# Patient Record
Sex: MALE | Race: WHITE | Employment: FULL TIME | URBAN - METROPOLITAN AREA
[De-identification: names, ages, dates, MRNs, and addresses within clinical notes are randomized per-mention and may not be internally consistent; named-entity substitution may affect disease eponyms.]

---

## 2017-03-01 ENCOUNTER — PATIENT MESSAGE (OUTPATIENT)
Dept: FAMILY MEDICINE CLINIC | Age: 52
End: 2017-03-01

## 2017-03-01 ENCOUNTER — OFFICE VISIT (OUTPATIENT)
Dept: FAMILY MEDICINE CLINIC | Age: 52
End: 2017-03-01

## 2017-03-01 VITALS
SYSTOLIC BLOOD PRESSURE: 139 MMHG | WEIGHT: 205.8 LBS | TEMPERATURE: 96.3 F | RESPIRATION RATE: 16 BRPM | OXYGEN SATURATION: 96 % | BODY MASS INDEX: 33.07 KG/M2 | HEART RATE: 98 BPM | DIASTOLIC BLOOD PRESSURE: 82 MMHG

## 2017-03-01 DIAGNOSIS — M54.12 CERVICAL RADICULOPATHY: Primary | ICD-10-CM

## 2017-03-01 DIAGNOSIS — M50.90 CERVICAL DISC DISEASE: ICD-10-CM

## 2017-03-01 DIAGNOSIS — E03.9 ACQUIRED HYPOTHYROIDISM: ICD-10-CM

## 2017-03-01 PROCEDURE — 99213 OFFICE O/P EST LOW 20 MIN: CPT | Performed by: FAMILY MEDICINE

## 2017-03-01 RX ORDER — SILDENAFIL 50 MG/1
TABLET, FILM COATED ORAL
Qty: 18 TABLET | Refills: 3 | Status: SHIPPED | OUTPATIENT
Start: 2017-03-01 | End: 2017-09-24 | Stop reason: SDUPTHER

## 2017-03-01 RX ORDER — METHYLPREDNISOLONE 4 MG/1
TABLET ORAL
Qty: 1 KIT | Refills: 0 | Status: SHIPPED | OUTPATIENT
Start: 2017-03-01 | End: 2017-03-07

## 2017-03-01 RX ORDER — CYCLOBENZAPRINE HCL 10 MG
TABLET ORAL
Qty: 90 TABLET | Refills: 2 | Status: SHIPPED | OUTPATIENT
Start: 2017-03-01 | End: 2017-07-07 | Stop reason: SDUPTHER

## 2017-03-01 RX ORDER — LEVOTHYROXINE SODIUM 0.03 MG/1
25 TABLET ORAL DAILY
Qty: 90 TABLET | Refills: 1 | Status: SHIPPED | OUTPATIENT
Start: 2017-03-01 | End: 2017-08-31 | Stop reason: SDUPTHER

## 2017-03-01 RX ORDER — HYDROCODONE BITARTRATE AND ACETAMINOPHEN 5; 325 MG/1; MG/1
TABLET ORAL
Qty: 60 TABLET | Refills: 0 | Status: SHIPPED | OUTPATIENT
Start: 2017-03-01 | End: 2017-03-02 | Stop reason: SDUPTHER

## 2017-03-02 ENCOUNTER — PATIENT MESSAGE (OUTPATIENT)
Dept: FAMILY MEDICINE CLINIC | Age: 52
End: 2017-03-02

## 2017-03-02 RX ORDER — HYDROCODONE BITARTRATE AND ACETAMINOPHEN 5; 325 MG/1; MG/1
TABLET ORAL
Qty: 60 TABLET | Refills: 0 | Status: SHIPPED | OUTPATIENT
Start: 2017-03-02 | End: 2017-03-16 | Stop reason: SDUPTHER

## 2017-03-16 ENCOUNTER — OFFICE VISIT (OUTPATIENT)
Dept: FAMILY MEDICINE CLINIC | Age: 52
End: 2017-03-16

## 2017-03-16 VITALS
WEIGHT: 212 LBS | HEART RATE: 80 BPM | BODY MASS INDEX: 34.07 KG/M2 | DIASTOLIC BLOOD PRESSURE: 82 MMHG | TEMPERATURE: 98 F | OXYGEN SATURATION: 97 % | RESPIRATION RATE: 16 BRPM | SYSTOLIC BLOOD PRESSURE: 136 MMHG

## 2017-03-16 DIAGNOSIS — M50.90 CERVICAL DISC DISEASE: ICD-10-CM

## 2017-03-16 DIAGNOSIS — S39.012A LUMBAR STRAIN, INITIAL ENCOUNTER: Primary | ICD-10-CM

## 2017-03-16 PROCEDURE — 99214 OFFICE O/P EST MOD 30 MIN: CPT | Performed by: FAMILY MEDICINE

## 2017-03-16 RX ORDER — METHOCARBAMOL 750 MG/1
750 TABLET, FILM COATED ORAL 4 TIMES DAILY
Qty: 40 TABLET | Refills: 0 | Status: SHIPPED | OUTPATIENT
Start: 2017-03-16 | End: 2017-03-26

## 2017-03-16 RX ORDER — HYDROCODONE BITARTRATE AND ACETAMINOPHEN 5; 325 MG/1; MG/1
TABLET ORAL
Qty: 60 TABLET | Refills: 0 | Status: SHIPPED | OUTPATIENT
Start: 2017-03-16 | End: 2017-05-11 | Stop reason: SDUPTHER

## 2017-03-17 ENCOUNTER — PATIENT MESSAGE (OUTPATIENT)
Dept: FAMILY MEDICINE CLINIC | Age: 52
End: 2017-03-17

## 2017-03-24 ENCOUNTER — HOSPITAL ENCOUNTER (OUTPATIENT)
Dept: PHYSICAL THERAPY | Age: 52
Discharge: HOME | End: 2017-03-31
Admitting: FAMILY MEDICINE

## 2017-03-24 ENCOUNTER — PATIENT MESSAGE (OUTPATIENT)
Dept: FAMILY MEDICINE CLINIC | Age: 52
End: 2017-03-24

## 2017-05-11 ENCOUNTER — OFFICE VISIT (OUTPATIENT)
Dept: FAMILY MEDICINE CLINIC | Age: 52
End: 2017-05-11

## 2017-05-11 ENCOUNTER — HOSPITAL ENCOUNTER (OUTPATIENT)
Dept: OTHER | Age: 52
Discharge: HOME | End: 2017-05-11
Attending: FAMILY MEDICINE | Admitting: FAMILY MEDICINE

## 2017-05-11 ENCOUNTER — TELEPHONE (OUTPATIENT)
Dept: FAMILY MEDICINE CLINIC | Age: 52
End: 2017-05-11

## 2017-05-11 VITALS
HEART RATE: 83 BPM | RESPIRATION RATE: 16 BRPM | BODY MASS INDEX: 32.85 KG/M2 | TEMPERATURE: 96.5 F | WEIGHT: 204.4 LBS | DIASTOLIC BLOOD PRESSURE: 76 MMHG | SYSTOLIC BLOOD PRESSURE: 115 MMHG

## 2017-05-11 DIAGNOSIS — S39.012A LUMBAR STRAIN, INITIAL ENCOUNTER: ICD-10-CM

## 2017-05-11 DIAGNOSIS — M54.16 LUMBAR RADICULOPATHY: ICD-10-CM

## 2017-05-11 DIAGNOSIS — M54.16 LUMBAR RADICULOPATHY: Primary | ICD-10-CM

## 2017-05-11 PROCEDURE — 99214 OFFICE O/P EST MOD 30 MIN: CPT | Performed by: FAMILY MEDICINE

## 2017-05-11 RX ORDER — HYDROCODONE BITARTRATE AND ACETAMINOPHEN 5; 325 MG/1; MG/1
1-2 TABLET ORAL EVERY 6 HOURS PRN
Qty: 60 TABLET | Refills: 0 | Status: SHIPPED | OUTPATIENT
Start: 2017-05-11 | End: 2017-07-07 | Stop reason: ALTCHOICE

## 2017-05-11 RX ORDER — ALPRAZOLAM 0.5 MG/1
TABLET ORAL
Qty: 2 TABLET | Refills: 0 | Status: SHIPPED | OUTPATIENT
Start: 2017-05-11 | End: 2017-07-07 | Stop reason: ALTCHOICE

## 2017-05-11 RX ORDER — HYDROCODONE BITARTRATE AND ACETAMINOPHEN 5; 325 MG/1; MG/1
1-2 TABLET ORAL EVERY 6 HOURS PRN
Qty: 60 TABLET | Refills: 0 | Status: SHIPPED | OUTPATIENT
Start: 2017-05-11 | End: 2017-05-11 | Stop reason: SDUPTHER

## 2017-05-31 ENCOUNTER — TELEPHONE (OUTPATIENT)
Dept: FAMILY MEDICINE CLINIC | Age: 52
End: 2017-05-31

## 2017-06-01 ENCOUNTER — TELEPHONE (OUTPATIENT)
Dept: FAMILY MEDICINE CLINIC | Age: 52
End: 2017-06-01

## 2017-06-07 ENCOUNTER — TELEPHONE (OUTPATIENT)
Dept: FAMILY MEDICINE CLINIC | Age: 52
End: 2017-06-07

## 2017-06-15 ENCOUNTER — PATIENT MESSAGE (OUTPATIENT)
Dept: FAMILY MEDICINE CLINIC | Age: 52
End: 2017-06-15

## 2017-06-15 DIAGNOSIS — M47.816 FACET ARTHROPATHY, LUMBAR: Primary | ICD-10-CM

## 2017-06-16 ENCOUNTER — TELEPHONE (OUTPATIENT)
Dept: PAIN MANAGEMENT | Age: 52
End: 2017-06-16

## 2017-06-21 ENCOUNTER — OFFICE VISIT (OUTPATIENT)
Dept: FAMILY MEDICINE CLINIC | Age: 52
End: 2017-06-21

## 2017-06-21 VITALS
DIASTOLIC BLOOD PRESSURE: 79 MMHG | SYSTOLIC BLOOD PRESSURE: 125 MMHG | HEART RATE: 79 BPM | BODY MASS INDEX: 31.76 KG/M2 | TEMPERATURE: 97.5 F | WEIGHT: 197.6 LBS | OXYGEN SATURATION: 99 % | RESPIRATION RATE: 16 BRPM

## 2017-06-21 DIAGNOSIS — M47.819 FACET ARTHROPATHY OF SPINE: Primary | ICD-10-CM

## 2017-06-21 PROCEDURE — 99214 OFFICE O/P EST MOD 30 MIN: CPT | Performed by: FAMILY MEDICINE

## 2017-06-21 RX ORDER — HYDROCODONE BITARTRATE AND ACETAMINOPHEN 10; 325 MG/1; MG/1
1 TABLET ORAL EVERY 6 HOURS PRN
Qty: 60 TABLET | Refills: 0 | Status: SHIPPED | OUTPATIENT
Start: 2017-06-21 | End: 2017-07-07 | Stop reason: DRUGHIGH

## 2017-06-21 RX ORDER — METHOCARBAMOL 750 MG/1
750 TABLET, FILM COATED ORAL 2 TIMES DAILY
Qty: 40 TABLET | Refills: 1 | Status: SHIPPED | OUTPATIENT
Start: 2017-06-21 | End: 2017-07-07

## 2017-06-21 RX ORDER — NABUMETONE 750 MG/1
750 TABLET, FILM COATED ORAL 2 TIMES DAILY
Qty: 60 TABLET | Refills: 3 | Status: SHIPPED | OUTPATIENT
Start: 2017-06-21 | End: 2017-07-07 | Stop reason: ALTCHOICE

## 2017-06-21 ASSESSMENT — PATIENT HEALTH QUESTIONNAIRE - PHQ9
1. LITTLE INTEREST OR PLEASURE IN DOING THINGS: 2
SUM OF ALL RESPONSES TO PHQ9 QUESTIONS 1 & 2: 3
SUM OF ALL RESPONSES TO PHQ QUESTIONS 1-9: 3
2. FEELING DOWN, DEPRESSED OR HOPELESS: 1

## 2017-07-07 ENCOUNTER — OFFICE VISIT (OUTPATIENT)
Dept: PAIN MANAGEMENT | Age: 52
End: 2017-07-07

## 2017-07-07 VITALS
HEART RATE: 81 BPM | BODY MASS INDEX: 30.52 KG/M2 | DIASTOLIC BLOOD PRESSURE: 85 MMHG | WEIGHT: 201.4 LBS | SYSTOLIC BLOOD PRESSURE: 124 MMHG | HEIGHT: 68 IN

## 2017-07-07 DIAGNOSIS — M47.816 SPONDYLOSIS OF LUMBAR REGION WITHOUT MYELOPATHY OR RADICULOPATHY: Primary | ICD-10-CM

## 2017-07-07 DIAGNOSIS — Z79.891 CHRONIC PRESCRIPTION OPIATE USE: ICD-10-CM

## 2017-07-07 DIAGNOSIS — M50.90 CERVICAL DISC DISEASE: ICD-10-CM

## 2017-07-07 PROCEDURE — 99204 OFFICE O/P NEW MOD 45 MIN: CPT | Performed by: ANESTHESIOLOGY

## 2017-07-07 RX ORDER — HYDROCODONE BITARTRATE AND ACETAMINOPHEN 5; 325 MG/1; MG/1
1 TABLET ORAL 2 TIMES DAILY PRN
Qty: 60 TABLET | Refills: 0 | Status: SHIPPED | OUTPATIENT
Start: 2017-07-07 | End: 2017-07-25 | Stop reason: SDUPTHER

## 2017-07-07 RX ORDER — CYCLOBENZAPRINE HCL 10 MG
TABLET ORAL
Qty: 60 TABLET | Refills: 1 | Status: SHIPPED | OUTPATIENT
Start: 2017-07-07 | End: 2017-09-01

## 2017-07-07 RX ORDER — NAPROXEN 250 MG/1
250 TABLET ORAL 2 TIMES DAILY WITH MEALS
COMMUNITY
End: 2017-07-07 | Stop reason: SDUPTHER

## 2017-07-07 RX ORDER — NAPROXEN 250 MG/1
250 TABLET ORAL 2 TIMES DAILY WITH MEALS
Qty: 60 TABLET | Refills: 1 | Status: SHIPPED | OUTPATIENT
Start: 2017-07-07 | End: 2022-04-05

## 2017-07-07 ASSESSMENT — PATIENT HEALTH QUESTIONNAIRE - PHQ9
1. LITTLE INTEREST OR PLEASURE IN DOING THINGS: 1
SUM OF ALL RESPONSES TO PHQ QUESTIONS 1-9: 2
SUM OF ALL RESPONSES TO PHQ9 QUESTIONS 1 & 2: 2
2. FEELING DOWN, DEPRESSED OR HOPELESS: 1

## 2017-07-07 ASSESSMENT — ENCOUNTER SYMPTOMS
SHORTNESS OF BREATH: 0
EYE REDNESS: 0
CONSTIPATION: 0
EYE PAIN: 0
ORTHOPNEA: 0
HEARTBURN: 0
WHEEZING: 0
ABDOMINAL PAIN: 0
NAUSEA: 0
EYE DISCHARGE: 0
COUGH: 0
BACK PAIN: 1
VOMITING: 0
HEMOPTYSIS: 0

## 2017-07-10 LAB
6-ACETYLMORPHINE: NOT DETECTED
7-AMINOCLONAZEPAM: NOT DETECTED
ALPHA-OH-ALPRAZOLAM: NOT DETECTED
ALPRAZOLAM: NOT DETECTED
AMPHETAMINE: NOT DETECTED
BARBITURATES: NOT DETECTED
BENZOYLECGONINE: NOT DETECTED
BUPRENORPHINE: NOT DETECTED
CARISOPRODOL: NOT DETECTED
CLONAZEPAM: NOT DETECTED
CODEINE: NOT DETECTED
CREATININE URINE: 35.4 MG/DL (ref 20–400)
DIAZEPAM: NOT DETECTED
DRUGS EXPECTED: NORMAL
EER PAIN MGT DRUG PANEL, HIGH RES/EMIT U: NORMAL
ETHYL GLUCURONIDE: NOT DETECTED
FENTANYL: NOT DETECTED
HYDROCODONE: PRESENT
HYDROMORPHONE: NOT DETECTED
LORAZEPAM: NOT DETECTED
MARIJUANA METABOLITE: NOT DETECTED
MDA: NOT DETECTED
MDEA: NOT DETECTED
MDMA URINE: NOT DETECTED
MEPERIDINE: NOT DETECTED
METHADONE: NOT DETECTED
METHAMPHETAMINE: NOT DETECTED
METHYLPHENIDATE: NOT DETECTED
MIDAZOLAM: NOT DETECTED
MORPHINE: NOT DETECTED
NORBUPRENORPHINE, FREE: NOT DETECTED
NORDIAZEPAM: NOT DETECTED
NORFENTANYL: NOT DETECTED
NORHYDROCODONE, URINE: PRESENT
NOROXYCODONE: NOT DETECTED
NOROXYMORPHONE, URINE: NOT DETECTED
OXAZEPAM: NOT DETECTED
OXYCODONE: NOT DETECTED
OXYMORPHONE: NOT DETECTED
PAIN MANAGEMENT DRUG PANEL: NORMAL
PAIN MANAGEMENT DRUG PANEL: NORMAL
PCP: NOT DETECTED
PHENTERMINE: NOT DETECTED
PROPOXYPHENE: NOT DETECTED
TAPENTADOL, URINE: NOT DETECTED
TAPENTADOL-O-SULFATE, URINE: NOT DETECTED
TEMAZEPAM: NOT DETECTED
TRAMADOL: NOT DETECTED
ZOLPIDEM: NOT DETECTED

## 2017-07-12 ENCOUNTER — PATIENT MESSAGE (OUTPATIENT)
Dept: PAIN MANAGEMENT | Age: 52
End: 2017-07-12

## 2017-07-25 ENCOUNTER — TELEPHONE (OUTPATIENT)
Dept: PAIN MANAGEMENT | Age: 52
End: 2017-07-25

## 2017-07-25 DIAGNOSIS — M47.816 SPONDYLOSIS OF LUMBAR REGION WITHOUT MYELOPATHY OR RADICULOPATHY: ICD-10-CM

## 2017-07-25 DIAGNOSIS — M47.816 FACET ARTHROPATHY, LUMBAR: Primary | ICD-10-CM

## 2017-07-25 RX ORDER — HYDROCODONE BITARTRATE AND ACETAMINOPHEN 5; 325 MG/1; MG/1
1 TABLET ORAL EVERY 8 HOURS PRN
Qty: 60 TABLET | Refills: 0 | Status: SHIPPED | OUTPATIENT
Start: 2017-07-28 | End: 2017-08-16 | Stop reason: SDUPTHER

## 2017-08-14 DIAGNOSIS — M47.819 FACET ARTHROPATHY OF SPINE: Primary | ICD-10-CM

## 2017-08-15 ENCOUNTER — TELEPHONE (OUTPATIENT)
Dept: PAIN MANAGEMENT | Age: 52
End: 2017-08-15

## 2017-08-15 DIAGNOSIS — M47.816 FACET ARTHROPATHY, LUMBAR: ICD-10-CM

## 2017-08-15 DIAGNOSIS — M47.816 SPONDYLOSIS OF LUMBAR REGION WITHOUT MYELOPATHY OR RADICULOPATHY: ICD-10-CM

## 2017-08-16 RX ORDER — HYDROCODONE BITARTRATE AND ACETAMINOPHEN 5; 325 MG/1; MG/1
1 TABLET ORAL EVERY 8 HOURS PRN
Qty: 60 TABLET | Refills: 0 | Status: SHIPPED | OUTPATIENT
Start: 2017-08-16 | End: 2017-09-05 | Stop reason: SDUPTHER

## 2017-08-31 DIAGNOSIS — M50.90 CERVICAL DISC DISEASE: ICD-10-CM

## 2017-08-31 DIAGNOSIS — E03.9 ACQUIRED HYPOTHYROIDISM: ICD-10-CM

## 2017-09-01 RX ORDER — LEVOTHYROXINE SODIUM 0.03 MG/1
TABLET ORAL
Qty: 90 TABLET | Refills: 1 | Status: SHIPPED | OUTPATIENT
Start: 2017-09-01 | End: 2018-02-05 | Stop reason: SDUPTHER

## 2017-09-01 RX ORDER — CYCLOBENZAPRINE HCL 10 MG
TABLET ORAL
Qty: 90 TABLET | Refills: 2 | Status: SHIPPED | OUTPATIENT
Start: 2017-09-01 | End: 2018-04-21 | Stop reason: SDUPTHER

## 2017-09-05 ENCOUNTER — TELEPHONE (OUTPATIENT)
Dept: PAIN MANAGEMENT | Age: 52
End: 2017-09-05

## 2017-09-05 DIAGNOSIS — M47.816 SPONDYLOSIS OF LUMBAR REGION WITHOUT MYELOPATHY OR RADICULOPATHY: ICD-10-CM

## 2017-09-05 DIAGNOSIS — M47.816 FACET ARTHROPATHY, LUMBAR: ICD-10-CM

## 2017-09-05 RX ORDER — HYDROCODONE BITARTRATE AND ACETAMINOPHEN 5; 325 MG/1; MG/1
1 TABLET ORAL EVERY 8 HOURS PRN
Qty: 90 TABLET | Refills: 0 | Status: SHIPPED | OUTPATIENT
Start: 2017-09-05 | End: 2017-10-04 | Stop reason: SDUPTHER

## 2017-09-25 RX ORDER — SILDENAFIL CITRATE 50 MG
TABLET ORAL
Qty: 18 TABLET | Refills: 3 | Status: SHIPPED | OUTPATIENT
Start: 2017-09-25 | End: 2018-02-16 | Stop reason: SDUPTHER

## 2017-09-28 ENCOUNTER — HOSPITAL ENCOUNTER (OUTPATIENT)
Dept: CARDIAC CATH/INVASIVE PROCEDURES | Age: 52
Discharge: HOME | End: 2017-09-28
Attending: ANESTHESIOLOGY | Admitting: ANESTHESIOLOGY

## 2017-09-28 DIAGNOSIS — G89.29 OTHER CHRONIC PAIN: Primary | ICD-10-CM

## 2017-09-28 DIAGNOSIS — M47.816 SPONDYLOSIS OF LUMBAR REGION WITHOUT MYELOPATHY OR RADICULOPATHY: ICD-10-CM

## 2017-09-28 RX ORDER — DULOXETIN HYDROCHLORIDE 20 MG/1
20 CAPSULE, DELAYED RELEASE ORAL DAILY
Qty: 30 CAPSULE | Refills: 0 | Status: SHIPPED | OUTPATIENT
Start: 2017-09-28 | End: 2017-12-04 | Stop reason: SDUPTHER

## 2017-09-29 ENCOUNTER — TELEPHONE (OUTPATIENT)
Dept: PAIN MANAGEMENT | Age: 52
End: 2017-09-29

## 2017-10-04 DIAGNOSIS — M47.816 FACET ARTHROPATHY, LUMBAR: ICD-10-CM

## 2017-10-04 DIAGNOSIS — M47.816 SPONDYLOSIS OF LUMBAR REGION WITHOUT MYELOPATHY OR RADICULOPATHY: ICD-10-CM

## 2017-10-04 RX ORDER — HYDROCODONE BITARTRATE AND ACETAMINOPHEN 5; 325 MG/1; MG/1
1 TABLET ORAL 2 TIMES DAILY PRN
Qty: 60 TABLET | Refills: 0 | Status: SHIPPED | OUTPATIENT
Start: 2017-10-04 | End: 2017-12-04 | Stop reason: DRUGHIGH

## 2017-10-12 ENCOUNTER — HOSPITAL ENCOUNTER (OUTPATIENT)
Dept: CARDIAC CATH/INVASIVE PROCEDURES | Age: 52
Discharge: HOME | End: 2017-10-12
Attending: ANESTHESIOLOGY | Admitting: ANESTHESIOLOGY

## 2017-10-12 DIAGNOSIS — M47.817 FACET DEGENERATION OF LUMBOSACRAL REGION: ICD-10-CM

## 2017-10-12 ASSESSMENT — ENCOUNTER SYMPTOMS
EYE REDNESS: 0
HEARTBURN: 0
EYE DISCHARGE: 0
HEMOPTYSIS: 0
EYE PAIN: 0
SHORTNESS OF BREATH: 0
ABDOMINAL PAIN: 0
COUGH: 0
ORTHOPNEA: 0
BACK PAIN: 1
WHEEZING: 0
CONSTIPATION: 0
NAUSEA: 0
VOMITING: 0

## 2017-10-12 NOTE — PROCEDURES
PROCEDURE: Lumbar facet medial branch blocks at L4-L5 and L5-S1 levels BILATERAL under fluoroscopy    Anesthesia: Local  Blood loss: None  Complications: None     Patient has chronic axial low back pain, unresponsive to physical therapy, medications, worse on extension/lateral rotation - here for diagnostic lumbar facet injections.     The entire procedure was done under sterile precautions. After informed consent, patient placed prone and monitors placed. The back was cleaned with chloraprep, skin and subcutaneous tissues infiltrated with 1% lidocaine. I used 22g 3 1/2 in long needles to access the junction of the superior aspect of the ala of sacrum with the inferior articular process of L5 and also the junctions of the superior aspect of transverse process with superior articular process of L4 and L5 bilaterally under fluoroscopy. After verification of needle tip under fluoroscopy, and negative aspiration for blood, I placed 0.5 ml of 0.25% Bupivacaine at each location for a total of 6 injections. Then the needles were withdrawn and hemostasis obtained and entry sites covered with band-aids.      Patient tolerated procedure well without any problems, was ambulatory and discharged in stable condition.     Plan:  Reports good benefit from the first diagnostic injection; will consider RF, if he continues to benefit.

## 2017-10-19 ENCOUNTER — TELEPHONE (OUTPATIENT)
Dept: PAIN MANAGEMENT | Age: 52
End: 2017-10-19

## 2017-10-19 NOTE — TELEPHONE ENCOUNTER
Noted.  Needs precert for RF lumb facets L4-5, L5-S1 Bilat  We have to find out where the RF unit is available in [place] - I will leave message for that.

## 2017-11-02 DIAGNOSIS — M47.816 SPONDYLOSIS OF LUMBAR REGION WITHOUT MYELOPATHY OR RADICULOPATHY: ICD-10-CM

## 2017-11-02 DIAGNOSIS — M47.816 FACET ARTHROPATHY, LUMBAR: ICD-10-CM

## 2017-11-02 RX ORDER — HYDROCODONE BITARTRATE AND ACETAMINOPHEN 5; 325 MG/1; MG/1
1 TABLET ORAL EVERY 8 HOURS PRN
Qty: 90 TABLET | Refills: 0 | Status: SHIPPED | OUTPATIENT
Start: 2017-11-02 | End: 2017-12-04 | Stop reason: DRUGHIGH

## 2017-11-02 NOTE — TELEPHONE ENCOUNTER
The patient called in and is needing a refill of his Hydrocodone, and Duloxetine.           [facility]

## 2017-12-04 ENCOUNTER — OFFICE VISIT (OUTPATIENT)
Dept: PAIN MANAGEMENT | Age: 52
End: 2017-12-04

## 2017-12-04 VITALS
DIASTOLIC BLOOD PRESSURE: 85 MMHG | SYSTOLIC BLOOD PRESSURE: 127 MMHG | HEIGHT: 68 IN | HEART RATE: 81 BPM | BODY MASS INDEX: 31.67 KG/M2 | WEIGHT: 209 LBS

## 2017-12-04 DIAGNOSIS — M47.896 OTHER SPONDYLOSIS, LUMBAR REGION: Primary | ICD-10-CM

## 2017-12-04 DIAGNOSIS — G89.29 OTHER CHRONIC PAIN: ICD-10-CM

## 2017-12-04 PROCEDURE — 99213 OFFICE O/P EST LOW 20 MIN: CPT | Performed by: ANESTHESIOLOGY

## 2017-12-04 RX ORDER — HYDROCODONE BITARTRATE AND ACETAMINOPHEN 5; 325 MG/1; MG/1
1 TABLET ORAL 2 TIMES DAILY PRN
Qty: 60 TABLET | Refills: 0 | Status: SHIPPED | OUTPATIENT
Start: 2017-12-04 | End: 2018-02-15 | Stop reason: SDUPTHER

## 2017-12-04 RX ORDER — DULOXETIN HYDROCHLORIDE 20 MG/1
20 CAPSULE, DELAYED RELEASE ORAL DAILY
Qty: 30 CAPSULE | Refills: 0 | Status: SHIPPED | OUTPATIENT
Start: 2017-12-04 | End: 2018-05-11

## 2017-12-04 RX ORDER — HYDROCODONE BITARTRATE AND ACETAMINOPHEN 5; 325 MG/1; MG/1
1 TABLET ORAL EVERY 8 HOURS PRN
COMMUNITY
End: 2017-12-04 | Stop reason: DRUGHIGH

## 2017-12-04 ASSESSMENT — ENCOUNTER SYMPTOMS
VOMITING: 0
ABDOMINAL PAIN: 0
EYE DISCHARGE: 0
SHORTNESS OF BREATH: 0
BACK PAIN: 1
NAUSEA: 0
EYE PAIN: 0
CONSTIPATION: 0
HEMOPTYSIS: 0
EYE REDNESS: 0
ORTHOPNEA: 0
COUGH: 0
WHEEZING: 0
HEARTBURN: 0

## 2017-12-04 NOTE — PATIENT INSTRUCTIONS
you home. How is medial branch neurotomy done? The doctor will use a tiny needle to numb the skin where you will get the neurotomy. Then he or she puts the neurotomy needle into the numbed area. You may feel some pressure. Using fluoroscopy (live X-ray) to guide the needle, the doctor sends radio waves through the needle to the nerve for 60 to 90 seconds. The radio waves heat the nerve, which damages it. The doctor may do this several times. And he or she may treat more than one nerve. It takes 45 to 90 minutes to get a neurotomy, depending on how many nerves are heated. You will probably go home 30 to 60 minutes later. You will need someone to drive you home. What can you expect after a neurotomy? You may feel a little sore or tender at the injection site at first. But after a successful neurotomy, most people have pain relief right away. It often lasts for 9 to 12 months or longer. Sometimes the pain relief is permanent. If your pain does come back, it may mean that the damaged nerve has healed and can send pain messages again. Or it can mean that a different nerve is causing pain. Your doctor will discuss your options with you. Follow-up care is a key part of your treatment and safety. Be sure to make and go to all appointments, and call your doctor if you are having problems. It's also a good idea to know your test results and keep a list of the medicines you take. Where can you learn more? Go to https://64 Pixels.[employer]. org and sign in to your VISENZE account. Enter F905 in the [employer] [facility] box to learn more about \"Learning About Medial Branch Block and Neurotomy. \"     If you do not have an account, please click on the \"Sign Up Now\" link. Current as of: October 14, 2016  Content Version: 11.3  © 7789-7562 [employer], [employer]orporated. Care instructions adapted under license by [facility] ([facility]).  If you have questions about a medical condition or this instruction, always ask your

## 2018-01-31 ENCOUNTER — TELEPHONE (OUTPATIENT)
Dept: PAIN MANAGEMENT | Age: 53
End: 2018-01-31

## 2018-01-31 DIAGNOSIS — M50.90 CERVICAL DISC DISEASE: ICD-10-CM

## 2018-01-31 DIAGNOSIS — M47.816 FACET ARTHROPATHY, LUMBAR: Primary | ICD-10-CM

## 2018-01-31 DIAGNOSIS — M47.819 FACET ARTHROPATHY OF SPINE: ICD-10-CM

## 2018-01-31 NOTE — TELEPHONE ENCOUNTER
Per PKA pt can have a rx of Norco 5-325mg PO TID, enough until dr. Shaylee Jenkins returns. Rx is pending what office pt wants to  at. PT needs to be informed of office locations and times we are there. Called pt and informed. Pt is going to be by the hospitals office tmr, if not tmr then Friday. Rx will be printed when pt comes to office.

## 2018-02-01 RX ORDER — HYDROCODONE BITARTRATE AND ACETAMINOPHEN 5; 325 MG/1; MG/1
1 TABLET ORAL EVERY 8 HOURS PRN
Qty: 18 TABLET | Refills: 0 | Status: SHIPPED | OUTPATIENT
Start: 2018-02-01 | End: 2018-02-15 | Stop reason: ALTCHOICE

## 2018-02-05 DIAGNOSIS — E03.9 ACQUIRED HYPOTHYROIDISM: ICD-10-CM

## 2018-02-05 RX ORDER — LEVOTHYROXINE SODIUM 0.03 MG/1
TABLET ORAL
Qty: 90 TABLET | Refills: 1 | Status: SHIPPED | OUTPATIENT
Start: 2018-02-05 | End: 2018-05-13 | Stop reason: SDUPTHER

## 2018-02-15 DIAGNOSIS — M47.896 OTHER SPONDYLOSIS, LUMBAR REGION: ICD-10-CM

## 2018-02-15 RX ORDER — HYDROCODONE BITARTRATE AND ACETAMINOPHEN 5; 325 MG/1; MG/1
1 TABLET ORAL 2 TIMES DAILY PRN
Qty: 60 TABLET | Refills: 0 | Status: SHIPPED | OUTPATIENT
Start: 2018-02-15 | End: 2018-04-13 | Stop reason: SDUPTHER

## 2018-02-15 RX ORDER — HYDROCODONE BITARTRATE AND ACETAMINOPHEN 5; 325 MG/1; MG/1
1 TABLET ORAL EVERY 8 HOURS PRN
Qty: 18 TABLET | Refills: 0 | Status: CANCELLED | OUTPATIENT
Start: 2018-02-15 | End: 2018-02-21

## 2018-02-15 NOTE — TELEPHONE ENCOUNTER
MEDICATION ISSUES    ALL MEDICATION REFILLS NEED 48-HOUR ADVANCED NOTICE - Not filled on weekends or holidays. Issue:  Medication Refill   Requesting Refill: Yes    Medication requested: Norco 5 mg    Reporting Side effects: No / If other, Describe: None    Last date filled: 2/1/18 By Dr. Caprice Buerger due to Dr. Tosha Renae being out of office for that week. OARRS done: Yes   Follow-up Appointment date: No   Is waiting for a [employer] appt.

## 2018-02-18 RX ORDER — SILDENAFIL 50 MG/1
TABLET, FILM COATED ORAL
Qty: 18 TABLET | Refills: 3 | Status: SHIPPED | OUTPATIENT
Start: 2018-02-18 | End: 2018-07-26 | Stop reason: SDUPTHER

## 2018-04-13 DIAGNOSIS — M47.896 OTHER SPONDYLOSIS, LUMBAR REGION: ICD-10-CM

## 2018-04-13 RX ORDER — HYDROCODONE BITARTRATE AND ACETAMINOPHEN 5; 325 MG/1; MG/1
1 TABLET ORAL 2 TIMES DAILY PRN
Qty: 60 TABLET | Refills: 0 | Status: SHIPPED | OUTPATIENT
Start: 2018-04-16 | End: 2018-04-30 | Stop reason: ALTCHOICE

## 2018-04-27 ENCOUNTER — TELEPHONE (OUTPATIENT)
Dept: PAIN MANAGEMENT | Age: 53
End: 2018-04-27

## 2018-04-30 ENCOUNTER — OFFICE VISIT (OUTPATIENT)
Dept: PAIN MANAGEMENT | Age: 53
End: 2018-04-30

## 2018-04-30 VITALS
HEIGHT: 68 IN | BODY MASS INDEX: 31.52 KG/M2 | HEART RATE: 102 BPM | WEIGHT: 208 LBS | DIASTOLIC BLOOD PRESSURE: 79 MMHG | SYSTOLIC BLOOD PRESSURE: 115 MMHG

## 2018-04-30 DIAGNOSIS — M47.896 OTHER SPONDYLOSIS, LUMBAR REGION: Primary | ICD-10-CM

## 2018-04-30 DIAGNOSIS — G89.29 OTHER CHRONIC PAIN: ICD-10-CM

## 2018-04-30 PROCEDURE — 99212 OFFICE O/P EST SF 10 MIN: CPT | Performed by: ANESTHESIOLOGY

## 2018-04-30 ASSESSMENT — ENCOUNTER SYMPTOMS
EYE DISCHARGE: 0
HEARTBURN: 0
HEMOPTYSIS: 0
ORTHOPNEA: 0
NAUSEA: 0
CONSTIPATION: 0
BACK PAIN: 0
ABDOMINAL PAIN: 0
COUGH: 0
SHORTNESS OF BREATH: 0
WHEEZING: 0
VOMITING: 0
EYE REDNESS: 0
EYE PAIN: 0

## 2018-05-08 PROBLEM — Z12.11 COLON CANCER SCREENING: Status: ACTIVE | Noted: 2018-05-08

## 2018-05-11 ENCOUNTER — OFFICE VISIT (OUTPATIENT)
Dept: FAMILY MEDICINE CLINIC | Age: 53
End: 2018-05-11

## 2018-05-11 VITALS
HEART RATE: 88 BPM | BODY MASS INDEX: 31.64 KG/M2 | TEMPERATURE: 95.7 F | RESPIRATION RATE: 16 BRPM | WEIGHT: 208.8 LBS | HEIGHT: 68 IN | OXYGEN SATURATION: 98 % | SYSTOLIC BLOOD PRESSURE: 127 MMHG | DIASTOLIC BLOOD PRESSURE: 89 MMHG

## 2018-05-11 DIAGNOSIS — E03.9 ACQUIRED HYPOTHYROIDISM: ICD-10-CM

## 2018-05-11 DIAGNOSIS — R74.8 ELEVATED LIVER ENZYMES: ICD-10-CM

## 2018-05-11 DIAGNOSIS — Z00.00 WELL ADULT EXAM: Primary | ICD-10-CM

## 2018-05-11 DIAGNOSIS — E78.1 HYPERTRIGLYCERIDEMIA: ICD-10-CM

## 2018-05-11 DIAGNOSIS — Z00.00 WELL ADULT EXAM: ICD-10-CM

## 2018-05-11 DIAGNOSIS — Z12.11 COLON CANCER SCREENING: ICD-10-CM

## 2018-05-11 LAB
A/G RATIO: 2.7 (ref 1.1–2.2)
ALBUMIN SERPL-MCNC: 5.4 G/DL (ref 3.4–5)
ALP BLD-CCNC: 46 U/L (ref 40–129)
ALT SERPL-CCNC: 39 U/L (ref 10–40)
ANION GAP SERPL CALCULATED.3IONS-SCNC: 16 MMOL/L (ref 3–16)
AST SERPL-CCNC: 25 U/L (ref 15–37)
BANDED NEUTROPHILS RELATIVE PERCENT: 1 % (ref 0–7)
BASOPHILS ABSOLUTE: 0 K/UL (ref 0–0.2)
BASOPHILS RELATIVE PERCENT: 0 %
BILIRUB SERPL-MCNC: 0.6 MG/DL (ref 0–1)
BUN BLDV-MCNC: 12 MG/DL (ref 7–20)
CALCIUM SERPL-MCNC: 9.7 MG/DL (ref 8.3–10.6)
CHLORIDE BLD-SCNC: 96 MMOL/L (ref 99–110)
CHOLESTEROL, TOTAL: 182 MG/DL (ref 0–199)
CO2: 23 MMOL/L (ref 21–32)
CREAT SERPL-MCNC: 0.7 MG/DL (ref 0.9–1.3)
EOSINOPHILS ABSOLUTE: 0.5 K/UL (ref 0–0.6)
EOSINOPHILS RELATIVE PERCENT: 6 %
GFR AFRICAN AMERICAN: >60
GFR NON-AFRICAN AMERICAN: >60
GLOBULIN: 2 G/DL
GLUCOSE BLD-MCNC: 95 MG/DL (ref 70–99)
HCT VFR BLD CALC: 46.2 % (ref 40.5–52.5)
HDLC SERPL-MCNC: 42 MG/DL (ref 40–60)
HEMOGLOBIN: 15.8 G/DL (ref 13.5–17.5)
LDL CHOLESTEROL CALCULATED: 102 MG/DL
LYMPHOCYTES ABSOLUTE: 3.3 K/UL (ref 1–5.1)
LYMPHOCYTES RELATIVE PERCENT: 38 %
MCH RBC QN AUTO: 29.8 PG (ref 26–34)
MCHC RBC AUTO-ENTMCNC: 34.1 G/DL (ref 31–36)
MCV RBC AUTO: 87.4 FL (ref 80–100)
MONOCYTES ABSOLUTE: 0.4 K/UL (ref 0–1.3)
MONOCYTES RELATIVE PERCENT: 5 %
NEUTROPHILS ABSOLUTE: 4.4 K/UL (ref 1.7–7.7)
NEUTROPHILS RELATIVE PERCENT: 50 %
PDW BLD-RTO: 13.2 % (ref 12.4–15.4)
PLATELET # BLD: 232 K/UL (ref 135–450)
PMV BLD AUTO: 8.6 FL (ref 5–10.5)
POIKILOCYTES: ABNORMAL
POTASSIUM SERPL-SCNC: 4.2 MMOL/L (ref 3.5–5.1)
RBC # BLD: 5.29 M/UL (ref 4.2–5.9)
SODIUM BLD-SCNC: 135 MMOL/L (ref 136–145)
TOTAL PROTEIN: 7.4 G/DL (ref 6.4–8.2)
TRIGL SERPL-MCNC: 191 MG/DL (ref 0–150)
TSH REFLEX: 8.16 UIU/ML (ref 0.27–4.2)
VLDLC SERPL CALC-MCNC: 38 MG/DL
WBC # BLD: 8.6 K/UL (ref 4–11)

## 2018-05-11 PROCEDURE — 99396 PREV VISIT EST AGE 40-64: CPT | Performed by: FAMILY MEDICINE

## 2018-05-11 ASSESSMENT — ENCOUNTER SYMPTOMS: BACK PAIN: 1

## 2018-05-12 LAB
ESTIMATED AVERAGE GLUCOSE: 111.2 MG/DL
HBA1C MFR BLD: 5.5 %
T4 FREE: 1.3 NG/DL (ref 0.9–1.8)

## 2018-05-13 DIAGNOSIS — E03.9 ACQUIRED HYPOTHYROIDISM: ICD-10-CM

## 2018-05-13 DIAGNOSIS — E03.9 ACQUIRED HYPOTHYROIDISM: Primary | ICD-10-CM

## 2018-05-13 RX ORDER — LEVOTHYROXINE SODIUM 0.05 MG/1
50 TABLET ORAL DAILY
Qty: 30 TABLET | Refills: 3 | Status: SHIPPED | OUTPATIENT
Start: 2018-05-13 | End: 2018-08-05

## 2018-05-14 DIAGNOSIS — M47.896 OTHER SPONDYLOSIS, LUMBAR REGION: ICD-10-CM

## 2018-05-14 RX ORDER — HYDROCODONE BITARTRATE AND ACETAMINOPHEN 5; 325 MG/1; MG/1
1 TABLET ORAL 2 TIMES DAILY PRN
Qty: 30 TABLET | Refills: 0 | Status: SHIPPED | OUTPATIENT
Start: 2018-05-14 | End: 2018-05-30 | Stop reason: SDUPTHER

## 2018-05-30 DIAGNOSIS — M47.896 OTHER SPONDYLOSIS, LUMBAR REGION: ICD-10-CM

## 2018-05-30 RX ORDER — HYDROCODONE BITARTRATE AND ACETAMINOPHEN 5; 325 MG/1; MG/1
1 TABLET ORAL 2 TIMES DAILY PRN
Qty: 30 TABLET | Refills: 0 | Status: SHIPPED | OUTPATIENT
Start: 2018-05-30 | End: 2018-06-14

## 2018-06-07 ENCOUNTER — HOSPITAL ENCOUNTER (OUTPATIENT)
Dept: CARDIAC CATH/INVASIVE PROCEDURES | Age: 53
Discharge: HOME | End: 2018-06-07
Attending: ANESTHESIOLOGY | Admitting: ANESTHESIOLOGY

## 2018-06-07 DIAGNOSIS — M48.07 LUMBOSACRAL SPINAL STENOSIS: ICD-10-CM

## 2018-06-07 PROBLEM — Z12.11 COLON CANCER SCREENING: Status: RESOLVED | Noted: 2018-05-08 | Resolved: 2018-06-07

## 2018-06-07 RX ORDER — ALPRAZOLAM 0.5 MG/1
1 TABLET ORAL ONCE
Status: COMPLETED | OUTPATIENT
Start: 2018-06-07 | End: 2018-06-07

## 2018-06-07 RX ADMIN — ALPRAZOLAM 1 MG: 0.5 TABLET ORAL at 08:49

## 2018-06-13 ENCOUNTER — TELEPHONE (OUTPATIENT)
Dept: PAIN MANAGEMENT | Age: 53
End: 2018-06-13

## 2018-06-25 ENCOUNTER — OFFICE VISIT (OUTPATIENT)
Dept: FAMILY MEDICINE CLINIC | Age: 53
End: 2018-06-25

## 2018-06-25 VITALS
RESPIRATION RATE: 12 BRPM | HEART RATE: 81 BPM | TEMPERATURE: 97.9 F | WEIGHT: 214.4 LBS | BODY MASS INDEX: 32.6 KG/M2 | DIASTOLIC BLOOD PRESSURE: 88 MMHG | OXYGEN SATURATION: 96 % | SYSTOLIC BLOOD PRESSURE: 130 MMHG

## 2018-06-25 DIAGNOSIS — M47.816 FACET ARTHROPATHY, LUMBAR: Primary | ICD-10-CM

## 2018-06-25 PROBLEM — M47.819 FACET ARTHROPATHY OF SPINE: Status: RESOLVED | Noted: 2017-06-21 | Resolved: 2018-06-25

## 2018-06-25 PROCEDURE — 99214 OFFICE O/P EST MOD 30 MIN: CPT | Performed by: FAMILY MEDICINE

## 2018-06-25 RX ORDER — IBUPROFEN 200 MG
400 TABLET ORAL EVERY 6 HOURS PRN
COMMUNITY
End: 2022-04-12

## 2018-06-27 ENCOUNTER — TELEPHONE (OUTPATIENT)
Dept: PAIN MANAGEMENT | Age: 53
End: 2018-06-27

## 2018-07-26 RX ORDER — SILDENAFIL 50 MG/1
50 TABLET, FILM COATED ORAL DAILY PRN
Qty: 18 TABLET | Refills: 3 | Status: SHIPPED | OUTPATIENT
Start: 2018-07-26 | End: 2018-12-31 | Stop reason: SDUPTHER

## 2019-02-18 ENCOUNTER — OFFICE VISIT (OUTPATIENT)
Dept: FAMILY MEDICINE CLINIC | Age: 54
End: 2019-02-18
Payer: COMMERCIAL

## 2019-02-18 VITALS
RESPIRATION RATE: 20 BRPM | OXYGEN SATURATION: 91 % | HEART RATE: 47 BPM | DIASTOLIC BLOOD PRESSURE: 92 MMHG | SYSTOLIC BLOOD PRESSURE: 138 MMHG

## 2019-02-18 DIAGNOSIS — M50.90 CERVICAL DISC DISEASE: Primary | ICD-10-CM

## 2019-02-18 DIAGNOSIS — M47.816 FACET ARTHROPATHY, LUMBAR: ICD-10-CM

## 2019-02-18 DIAGNOSIS — E03.9 ACQUIRED HYPOTHYROIDISM: ICD-10-CM

## 2019-02-18 PROCEDURE — 99214 OFFICE O/P EST MOD 30 MIN: CPT | Performed by: FAMILY MEDICINE

## 2019-02-18 RX ORDER — HYDROCODONE BITARTRATE AND ACETAMINOPHEN 5; 325 MG/1; MG/1
1 TABLET ORAL EVERY 6 HOURS PRN
Qty: 30 TABLET | Refills: 0 | Status: SHIPPED | OUTPATIENT
Start: 2019-02-18 | End: 2019-04-26 | Stop reason: SDUPTHER

## 2019-02-18 RX ORDER — CYCLOBENZAPRINE HCL 10 MG
TABLET ORAL
Qty: 90 TABLET | Refills: 3 | Status: SHIPPED | OUTPATIENT
Start: 2019-02-18 | End: 2019-07-04 | Stop reason: SDUPTHER

## 2019-02-18 RX ORDER — LEVOTHYROXINE SODIUM 0.05 MG/1
50 TABLET ORAL DAILY
Qty: 90 TABLET | Refills: 0 | Status: SHIPPED | OUTPATIENT
Start: 2019-02-18 | End: 2019-08-20 | Stop reason: SDUPTHER

## 2019-02-18 ASSESSMENT — PATIENT HEALTH QUESTIONNAIRE - PHQ9
2. FEELING DOWN, DEPRESSED OR HOPELESS: 0
SUM OF ALL RESPONSES TO PHQ QUESTIONS 1-9: 0
SUM OF ALL RESPONSES TO PHQ QUESTIONS 1-9: 0
1. LITTLE INTEREST OR PLEASURE IN DOING THINGS: 0
SUM OF ALL RESPONSES TO PHQ9 QUESTIONS 1 & 2: 0

## 2019-04-17 NOTE — PROGRESS NOTES
Heart Disease Father         a-fib    Alzheimer's Disease Mother        Social History     Socioeconomic History    Marital status:      Spouse name: Not on file    Number of children: Not on file    Years of education: Not on file    Highest education level: Not on file   Occupational History    Occupation:    Social Needs    Financial resource strain: Not on file    Food insecurity:     Worry: Not on file     Inability: Not on file    Transportation needs:     Medical: Not on file     Non-medical: Not on file   Tobacco Use    Smoking status: Former Smoker     Types: E-Cigarettes    Smokeless tobacco: Current User   Substance and Sexual Activity    Alcohol use: Yes     Alcohol/week: 3.6 oz     Types: 6 Standard drinks or equivalent per week     Comment: OCCASIONAL    Drug use: No     Types: Marijuana     Comment: ONLY IN COLLEGE    Sexual activity: Yes   Lifestyle    Physical activity:     Days per week: Not on file     Minutes per session: Not on file    Stress: Not on file   Relationships    Social connections:     Talks on phone: Not on file     Gets together: Not on file     Attends [employer]an service: Not on file     Active member of club or organization: Not on file     Attends meetings of clubs or organizations: Not on file     Relationship status: Not on file    Intimate partner violence:     Fear of current or ex partner: Not on file     Emotionally abused: Not on file     Physically abused: Not on file     Forced sexual activity: Not on file   Other Topics Concern    Not on file   Social History Narrative    Not on file         Imaging Studies:   MRI LS (06/15/2017)  \"CONCLUSION:    1. No acute compressive disc herniations. No central canal stenosis. No foraminal stenosis. 2. Multilevel facet hypertrophy with capsulitis most conspicuous at L4-5 and L5-S1.  \"      Results of the above studies were personally reviewed by me with the patient indetail along with the images, when available. The patient was instructed to contact the primary care provider regarding other unrelated findings not addressed during today's visit. Review of Systems:   Review of Systems   Constitutional: Negative for chills and fever. HENT: Negative for hearing loss and tinnitus. Eyes: Negative for pain, discharge and redness. Respiratory: Negative for cough, shortness of breath and wheezing. Cardiovascular: Negative for chest pain and palpitations. Gastrointestinal: Negative for abdominal pain, constipation, nausea and vomiting. Genitourinary: Negative for frequency, hematuria and urgency. Musculoskeletal: Positive for back pain. Negative for myalgias and neck pain. Skin: Negative for rash. Neurological: Negative for dizziness, seizures, weakness and headaches. Hematological: Does not bruise/bleed easily. Psychiatric/Behavioral: Negative for suicidal ideas. The patient is not nervous/anxious. The patient was instructed to contact primary care physician regarding ROS positives not being addressed during today's visit. Physical Exam:   Physical Exam   Constitutional: He is oriented to person, place, and time. No distress. HENT:   Head: Normocephalic. Eyes: Pupils are equal, round, and reactive to light. EOM are normal.   Neck: Normal range of motion. Neck supple. No thyromegaly present. Cardiovascular: Normal rate, regular rhythm, normal heart sounds and intact distal pulses. Pulmonary/Chest: Effort normal and breath sounds normal. No respiratory distress. He exhibits no tenderness. Abdominal: Soft. Bowel sounds are normal. He exhibits no mass. There is no tenderness. There is no guarding. Musculoskeletal: Normal range of motion. He exhibits no tenderness or deformity. Mild to mod lumbar paraspinal tenderness, worse on extension and lateral rotation   Lymphadenopathy:     He has no cervical adenopathy.    Neurological: He is alert and oriented to person, place, and time. He has normal strength and normal reflexes. He displays normal reflexes. No cranial nerve deficit or sensory deficit. He exhibits normal muscle tone. Coordination and gait normal. He displays no Babinski's sign on the right side. He displays no Babinski's sign on the left side. Reflex Scores:       Tricep reflexes are 2+ on the right side and 2+ on the left side. Bicep reflexes are 2+ on the right side and 2+ on the left side. Brachioradialis reflexes are 2+ on the right side and 2+ on the left side. Patellar reflexes are 2+ on the right side and 2+ on the left side. Achilles reflexes are 2+ on the right side and 2+ on the left side. Skin: Skin is warm. No rash noted. He is not diaphoretic. Psychiatric: He has a normal mood and affect. His behavior is normal. Thought content normal.       Vitals:    04/18/19 1550   BP: (!) 133/91   Site: Right Upper Arm   Position: Sitting   Cuff Size: Large Adult   Pulse: 100   Weight: 210 lb (95.3 kg)   Height: 5' 8\" (1.727 m)       Body mass index is 31.93 kg/m². Pain Score:   7 /10    Goals of chronic pain therapy:      Multi-disciplinary comprehensive pain management with functional improvement. Prescription pain medication monitoring:      MEDD current = 0   ORT Score = 4   MEDIUM     Other Risk factors - Depression       Date of Last Medication Agreement: NA   Date Naloxone prescribed: NA     UDT:    Date of last UDT: 07/07/2017    Adverse report: No     OARRS:    Checked today: Yes    Adverse report: No     Medication Effects -        Analgesia:      Average level of pain for the past month = 7/10     Percentage of pain relief = 90%     Activities Improved: Activities of daily living = 70%     Sleep = 70%     Mood = 50%     Social functioning = 50%     Adverse Effects:      Any adverse effects: No     Type/Severity of side effect: None    Aberrant Behavior:     Requests for frequent early refills: No     Increased dose without in shared decision making. The entire treatment plan was discussed with patient and agreed. Thank you for allowing us to participate in the care of your patient - please do not hesitate to contact us if you have any questions or concerns.         Pennie Garg MD  Board Certified in Anesthesiology and Pain Medicine

## 2019-04-18 ENCOUNTER — OFFICE VISIT (OUTPATIENT)
Dept: PAIN MANAGEMENT | Age: 54
End: 2019-04-18
Payer: COMMERCIAL

## 2019-04-18 VITALS
HEIGHT: 68 IN | BODY MASS INDEX: 31.83 KG/M2 | DIASTOLIC BLOOD PRESSURE: 91 MMHG | HEART RATE: 100 BPM | SYSTOLIC BLOOD PRESSURE: 133 MMHG | WEIGHT: 210 LBS

## 2019-04-18 DIAGNOSIS — M47.816 FACET ARTHROPATHY, LUMBAR: ICD-10-CM

## 2019-04-18 DIAGNOSIS — M47.896 OTHER SPONDYLOSIS, LUMBAR REGION: Primary | ICD-10-CM

## 2019-04-18 DIAGNOSIS — G89.29 OTHER CHRONIC PAIN: ICD-10-CM

## 2019-04-18 PROCEDURE — 99213 OFFICE O/P EST LOW 20 MIN: CPT | Performed by: ANESTHESIOLOGY

## 2019-04-18 ASSESSMENT — ENCOUNTER SYMPTOMS
BACK PAIN: 1
SHORTNESS OF BREATH: 0
EYE DISCHARGE: 0
WHEEZING: 0
COUGH: 0
EYE REDNESS: 0
NAUSEA: 0
ABDOMINAL PAIN: 0
VOMITING: 0
EYE PAIN: 0
CONSTIPATION: 0

## 2019-04-18 NOTE — PATIENT INSTRUCTIONS
Patient Education     Continue home exercises and naprosyn  You may return for RF procedure as discussed    Chronic Pain: Care Instructions  Your Care Instructions    Chronic pain is pain that lasts a long time (months or even years) and may or may not have a clear cause. It is different from acute pain, which usually does have a clear cause--like an injury or illness--and gets better over time. Chronic pain:  · Lasts over time but may vary from day to day. · Does not go away despite efforts to end it. · May disrupt your sleep and lead to fatigue. · May cause depression or anxiety. · May make your muscles tense, causing more pain. · Can disrupt your work, hobbies, home life, and relationships with friends and family. Chronic pain is a very real condition. It is not just in your head. Treatment can help and usually includes several methods used together, such as medicines, physical therapy, exercise, and other treatments. Learning how to relax and changing negative thought patterns can also help you cope. Chronic pain is complex. Taking an active role in your treatment will help you better manage your pain. Tell your doctor if you have trouble dealing with your pain. You may have to try several things before you find what works best for you. Follow-up care is a key part of your treatment and safety. Be sure to make and go to all appointments, and call your doctor if you are having problems. It's also a good idea to know your test results and keep a list of the medicines you take. How can you care for yourself at home? · Pace yourself. Break up large jobs into smaller tasks. Save harder tasks for days when you have less pain, or go back and forth between hard tasks and easier ones. Take rest breaks. · Relax, and reduce stress. Relaxation techniques such as deep breathing or meditation can help. · Keep moving. Gentle, daily exercise can help reduce pain over the long run.  Try low- or no-impact exercises such as walking, swimming, and stationary biking. Do stretches to stay flexible. · Try heat, cold packs, and massage. · Get enough sleep. Chronic pain can make you tired and drain your energy. Talk with your doctor if you have trouble sleeping because of pain. · Think positive. Your thoughts can affect your pain level. Do things that you enjoy to distract yourself when you have pain instead of focusing on the pain. See a movie, read a book, listen to music, or spend time with a friend. · If you think you are depressed, talk to your doctor about treatment. · Keep a daily pain diary. Record how your moods, thoughts, sleep patterns, activities, and medicine affect your pain. You may find that your pain is worse during or after certain activities or when you are feeling a certain emotion. Having a record of your pain can help you and your doctor find the best ways to treat your pain. · Take pain medicines exactly as directed. ? If the doctor gave you a prescription medicine for pain, take it as prescribed. ? If you are not taking a prescription pain medicine, ask your doctor if you can take an over-the-counter medicine. Reducing constipation caused by pain medicine  · Include fruits, vegetables, beans, and whole grains in your diet each day. These foods are high in fiber. · Drink plenty of fluids, enough so that your urine is light yellow or clear like water. If you have kidney, heart, or liver disease and have to limit fluids, talk with your doctor before you increase the amount of fluids you drink. · If your doctor recommends it, get more exercise. Walking is a good choice. Bit by bit, increase the amount you walk every day. Try for at least 30 minutes on most days of the week. · Schedule time each day for a bowel movement. A daily routine may help. Take your time and do not strain when having a bowel movement. When should you call for help?   Call your doctor now or seek immediate medical care if:    · Your pain gets worse or is out of control.     · You feel down or blue, or you do not enjoy things like you once did. You may be depressed, which is common in people with chronic pain. Depression can be treated.     · You have vomiting or cramps for more than 2 hours.    Watch closely for changes in your health, and be sure to contact your doctor if:    · You cannot sleep because of pain.     · You are very worried or anxious about your pain.     · You have trouble taking your pain medicine.     · You have any concerns about your pain medicine.     · You have trouble with bowel movements, such as:  ? No bowel movement in 3 days. ? Blood in the anal area, in your stool, or on the toilet paper. ? Diarrhea for more than 24 hours. Where can you learn more? Go to https://moksha[employer] Pharm[employer]geb.[employer]. org and sign in to your Nonlinear Dynamics account. Enter N004 in the Om[employer]Earth box to learn more about \"Chronic Pain: Care Instructions. \"     If you do not have an account, please click on the \"Sign Up Now\" link. Current as of: Dunia 3, 2018  Content Version: 11.9  © 8663-1505 [employer], Incorporated. Care instructions adapted under license by [facility] ([facility]). If you have questions about a medical condition or this instruction, always ask your healthcare professional. [address] any warranty or liability for your use of this information.

## 2019-04-26 ENCOUNTER — OFFICE VISIT (OUTPATIENT)
Dept: FAMILY MEDICINE CLINIC | Age: 54
End: 2019-04-26
Payer: COMMERCIAL

## 2019-04-26 VITALS
OXYGEN SATURATION: 96 % | WEIGHT: 219.8 LBS | HEART RATE: 90 BPM | TEMPERATURE: 97.8 F | BODY MASS INDEX: 33.31 KG/M2 | DIASTOLIC BLOOD PRESSURE: 90 MMHG | HEIGHT: 68 IN | SYSTOLIC BLOOD PRESSURE: 138 MMHG

## 2019-04-26 DIAGNOSIS — F33.8 SEASONAL AFFECTIVE DISORDER (HCC): ICD-10-CM

## 2019-04-26 DIAGNOSIS — M50.90 CERVICAL DISC DISEASE: Primary | ICD-10-CM

## 2019-04-26 DIAGNOSIS — R03.0 ELEVATED BLOOD PRESSURE READING: ICD-10-CM

## 2019-04-26 DIAGNOSIS — M47.816 FACET ARTHROPATHY, LUMBAR: ICD-10-CM

## 2019-04-26 PROCEDURE — 99214 OFFICE O/P EST MOD 30 MIN: CPT | Performed by: FAMILY MEDICINE

## 2019-04-26 RX ORDER — FLUOXETINE HYDROCHLORIDE 20 MG/1
20 CAPSULE ORAL DAILY
Qty: 30 CAPSULE | Refills: 5 | Status: SHIPPED | OUTPATIENT
Start: 2019-04-26 | End: 2019-08-20 | Stop reason: SDUPTHER

## 2019-04-26 RX ORDER — HYDROCODONE BITARTRATE AND ACETAMINOPHEN 5; 325 MG/1; MG/1
1 TABLET ORAL EVERY 6 HOURS PRN
Qty: 45 TABLET | Refills: 0 | Status: SHIPPED | OUTPATIENT
Start: 2019-04-26 | End: 2019-05-06

## 2019-04-26 NOTE — PROGRESS NOTES
[facility]  Progress Note  Gil Welch DO Roscoe Greco  1965    04/29/19    Chief Complaint:   Roscoe Greco is a 48 y.o. male who is here for spine pain, HTN, and depression    HPI:   Has spine RFA scheduled next week. [facility] asked PCP clinician to prescribe opioids. Has had benefit in past and takes sparingly. Denies constipation on this. Sleeping more than usual.  Has hx of seasonal affective disored. Requests to resume Prozac for now. Denies SI/HI. Better focused on medication as .  Has BP cuff at home. ROS negative for headache, vision changes, chest pain, shortness of breath, abdominal pain, urinary sx, bowel changes. Past medical, surgical, and social history reviewed. Medications and allergies reviewed. No Known Allergies  Prior to Visit Medications    Medication Sig Taking? Authorizing Provider   HYDROcodone-acetaminophen (NORCO) 5-325 MG per tablet Take 1 tablet by mouth every 6 hours as needed for Pain for up to 30 doses. Yes July Loomis, DO   FLUoxetine (PROZAC) 20 MG capsule Take 1 capsule by mouth daily Yes July Loomis DO   cyclobenzaprine (FLEXERIL) 10 MG tablet TAKE ONE-HALF (1/2) TO ONE TABLET EVERY 8 HOURS AS NEEDED FOR MUSCLE SPASMS Yes July Loomis DO   levothyroxine (SYNTHROID) 50 MCG tablet Take 1 tablet by mouth Daily Yes July Loomis DO   sildenafil (VIAGRA) 50 MG tablet TAKE 1 TABLET DAILY AS NEEDED FOR ERECTILE DYSFUNCTION Yes Katherine Ware MD   ibuprofen (ADVIL;MOTRIN) 200 MG tablet Take 400 mg by mouth every 6 hours as needed for Pain Yes Historical Provider, MD   naproxen (NAPROSYN) 250 MG tablet Take 1 tablet by mouth 2 times daily (with meals) Indications: TAKING 2 EVERY DAY Yes Ciro Jamison MD   diflorasone (PSORCON) 0.05 % ointment APPLY TOPICALLY DAILY Yes Katherine Ware MD   Multiple Vitamin (THERA) TABS Take  by mouth.    Yes Historical Provider, MD Vitals:    04/26/19 1547 04/26/19 1604   BP: (!) 152/89 (!) 138/90   Site: Left Upper Arm    Position: Sitting    Cuff Size: Large Adult    Pulse: 90    Temp: 97.8 °F (36.6 °C)    TempSrc: Oral    SpO2: 96%    Weight: 219 lb 12.8 oz (99.7 kg)    Height: 5' 8\" (1.727 m)       Wt Readings from Last 3 Encounters:   04/26/19 219 lb 12.8 oz (99.7 kg)   04/18/19 210 lb (95.3 kg)   06/25/18 214 lb 6.4 oz (97.3 kg)     BP Readings from Last 3 Encounters:   04/26/19 (!) 138/90   04/18/19 (!) 133/91   02/18/19 (!) 138/92       Patient Active Problem List   Diagnosis    External hemorrhoid    Hypertriglyceridemia    Psoriasis    Elevated blood pressure reading    Elevated liver enzymes    Cervical disc disease    Acquired hypothyroidism    Facet arthropathy, lumbar       Immunization History   Administered Date(s) Administered    Influenza Virus Vaccine 10/04/2011    Influenza Whole 11/23/2010    Influenza, Quadv, 3 Years and older, IM (Fluzone 3 yrs and older or Afluria 5 yrs and older) 09/19/2016    Pneumococcal Polysaccharide ([id number]) 09/19/2016    Tdap (Boostrix, Adacel) 04/18/2013       Past Medical History:   Diagnosis Date    External hemorrhoid     Hypertriglyceridemia     Psoriasis     Sprain of thoracic region      No past surgical history on file.   Family History   Problem Relation Age of Onset    Heart Disease Father         a-fib    Alzheimer's Disease Mother      Social History     Socioeconomic History    Marital status:      Spouse name: Not on file    Number of children: Not on file    Years of education: Not on file    Highest education level: Not on file   Occupational History    Occupation:    Social Needs    Financial resource strain: Not on file    Food insecurity:     Worry: Not on file     Inability: Not on file    Transportation needs:     Medical: Not on file     Non-medical: Not on file   Tobacco Use    Smoking status: Former Smoker     Types: E-Cigarettes    Smokeless tobacco: Current User   Substance and Sexual Activity    Alcohol use: Yes     Alcohol/week: 3.6 oz     Types: 6 Standard drinks or equivalent per week     Comment: OCCASIONAL    Drug use: No     Types: Marijuana     Comment: ONLY IN COLLEGE    Sexual activity: Yes   Lifestyle    Physical activity:     Days per week: Not on file     Minutes per session: Not on file    Stress: Not on file   Relationships    Social connections:     Talks on phone: Not on file     Gets together: Not on file     Attends Sikhism service: Not on file     Active member of club or organization: Not on file     Attends meetings of clubs or organizations: Not on file     Relationship status: Not on file    Intimate partner violence:     Fear of current or ex partner: Not on file     Emotionally abused: Not on file     Physically abused: Not on file     Forced sexual activity: Not on file   Other Topics Concern    Not on file   Social History Narrative    Not on file       O: BP (!) 138/90   Pulse 90   Temp 97.8 °F (36.6 °C) (Oral)   Ht 5' 8\" (1.727 m)   Wt 219 lb 12.8 oz (99.7 kg)   SpO2 96%   BMI 33.42 kg/m²   Physical Exam  GEN: No acute distress, cooperative, well nourished, alert. HEENT: PEERLA, EOMI , normocephalic/atraumatic, nares and oropharynx clear. Mucus membranes normal, Tympanic membranes clear bilaterally. Neck: soft, supple, no thyromegaly, mass, no Lymphadenopathy  CV: Regular rate and rhythm, no murmur, rubs, gallops. No edema. Resp: Clear to auscultation bilaterally good air entry bilaterally  No crackles, wheeze. Breathing comfortably. Psych: dysthymic affect. Denies SI/HI. Neuro: AOx3      ASSESSMENT   Diagnosis Orders   1. Cervical disc disease  HYDROcodone-acetaminophen (NORCO) 5-325 MG per tablet   2. Facet arthropathy, lumbar  HYDROcodone-acetaminophen (NORCO) 5-325 MG per tablet   3. Elevated blood pressure reading     4.  Seasonal affective disorder ([facility] [address] FLUoxetine (PROZAC) 20 MG capsule     #1 and #2: The risks, benefits, potential side effects and barriers to medication use were addressed today. Understanding was acknowledged. Patient asked to follow-up if condition(s) do not improve as anticipated. Should last him for the next 30 days. #3: see plan below. #4: The risks, benefits, potential side effects and barriers to medication use were addressed today. Understanding was acknowledged. Patient asked to follow-up if condition(s) do not improve as anticipated. PLAN          See rest of plan under patient instructions. Return in about 3 months (around 7/26/2019) for elevated BP and chronic pain and depression with PCP. Patient Instructions   1) Continue to track BP this year. If averaging > 140/90, it is advisable to take a daily BP pill. 2) Norco (Vicodin) is renewed. 3) Take the fluoxetine for now. Call or send [employer] message when you feel that you can wean off the medication. Please note a portion of this chart was generated using dragon dictation software. Although every effort was made to ensure the accuracy of this automated transcription, some errors in transcription may have occurred.

## 2019-04-26 NOTE — LETTER
[facility]  [address] [address]  Phone: [phone]  Fax: Jordi Arizmendi,         April 26, 2019     Patient: Dale Mckeon       Date of Visit: 4/26/2019       To Whom It May Concern: It is my medical opinion that Polina Burgess is under my care and is undergoing a procedure for his condition. I advise that he rest from work from May 2 to May 12, 2019. If you have any questions or concerns, please don't hesitate to call.     Sincerely,        Fabio Marie, DO

## 2019-04-26 NOTE — PATIENT INSTRUCTIONS
1) Continue to track BP this year. If averaging > 140/90, it is advisable to take a daily BP pill. 2) Norco (Vicodin) is renewed. 3) Take the fluoxetine for now. Call or send [employer] message when you feel that you can wean off the medication.

## 2019-05-02 ENCOUNTER — HOSPITAL ENCOUNTER (OUTPATIENT)
Dept: INTERVENTIONAL RADIOLOGY/VASCULAR | Age: 54
Discharge: HOME | End: 2019-05-02
Attending: ANESTHESIOLOGY | Admitting: ANESTHESIOLOGY
Payer: COMMERCIAL

## 2019-05-02 DIAGNOSIS — M48.07 LUMBOSACRAL SPINAL STENOSIS: ICD-10-CM

## 2019-05-02 PROCEDURE — 64635 DESTROY LUMB/SAC FACET JNT: CPT | Performed by: ANESTHESIOLOGY

## 2019-05-02 PROCEDURE — 64636 DESTROY L/S FACET JNT ADDL: CPT | Performed by: ANESTHESIOLOGY

## 2019-05-02 PROCEDURE — 64635 DESTROY LUMB/SAC FACET JNT: CPT

## 2019-05-02 PROCEDURE — 64636 DESTROY L/S FACET JNT ADDL: CPT

## 2019-05-02 PROCEDURE — 2709999900 HC NON-CHARGEABLE SUPPLY

## 2019-05-02 NOTE — PROCEDURES
Procedure: Radiofrequency (RF) of Lumbar facet medial branches at L4- L5 and L5-S1 under fluoroscopy - Bilateral     Anesthesia: Local  Blood loss: None  Complications: None     Patient has chronic low back pain with axial features due to facet aggravation, worse on extension/ lateral rotation. Failed conservative care with PT, meds and good benefit from diagnostic lumbar facet injections X 2. He had good benefit (80%) from RF for almost 9 months; pain recurring - here to repeat the same.     The entire procedure was done under sterile precautions. After informed, patient placed prone and monitors placed. Time-out session was done. The back was cleaned with Choraprep and sterile drapes placed. The skin and subcutaneous tissues infiltrated with 1% lidocaine. I used 20g 3 1/2 in long 10 mm tip RF needles to access the junction of the superior aspect of ala of sacrum with inferior articular process of L5, and also the junctions of the superior aspect of transverse process with superior articular process of L4 and L5 Bilateral under fluoroscopy. After verification of needle tip, the impedance was verified to be between 300 Ohms to 500 Ohms, sensory stimulation at 50 Hz between 0.3 Volts to 0.5 Volts, and motor stimulation at 2 Hz negative at three times the sensory stimulation. After the stimulation parameters were met and negative aspiration,  I placed 0.5 ml of 0.25% bupivacaine at each location. Then RF lesioning was carried out at 80 deg C for a duration of 90 sec at each location for a total of 6 lesions. Then the needles were withdrawn, hemostasis obtained and entry sites covered with band-aids.      Patient tolerated procedure well without any problems, was ambulatory and was discharged in stable condition.     Plan:  I will assess benefit in a month.

## 2019-05-10 ENCOUNTER — TELEPHONE (OUTPATIENT)
Dept: FAMILY MEDICINE CLINIC | Age: 54
End: 2019-05-10

## 2019-05-16 ENCOUNTER — TELEPHONE (OUTPATIENT)
Dept: PAIN MANAGEMENT | Age: 54
End: 2019-05-16

## 2019-05-28 NOTE — TELEPHONE ENCOUNTER
Pt called back. Date that Thom Freitas reported stopping work: 5/2/2019    Date of his back and neck disability: 5/2/2019    Pt has already returned to work on 5/13/2019. He also states the form should note that he was treated with pain medication and muscle relaxers, and therefore could not drive to work.

## 2019-07-04 DIAGNOSIS — E03.9 ACQUIRED HYPOTHYROIDISM: ICD-10-CM

## 2019-07-04 DIAGNOSIS — M50.90 CERVICAL DISC DISEASE: ICD-10-CM

## 2019-07-04 DIAGNOSIS — Z00.00 WELL ADULT EXAM: Primary | ICD-10-CM

## 2019-07-04 RX ORDER — SILDENAFIL 50 MG/1
TABLET, FILM COATED ORAL
Qty: 18 TABLET | Refills: 0 | Status: SHIPPED | OUTPATIENT
Start: 2019-07-04 | End: 2019-08-03 | Stop reason: SDUPTHER

## 2019-07-04 RX ORDER — CYCLOBENZAPRINE HCL 10 MG
TABLET ORAL
Qty: 90 TABLET | Refills: 0 | Status: SHIPPED | OUTPATIENT
Start: 2019-07-04 | End: 2019-10-03 | Stop reason: SDUPTHER

## 2019-08-04 RX ORDER — SILDENAFIL 50 MG/1
TABLET, FILM COATED ORAL
Qty: 18 TABLET | Refills: 0 | Status: SHIPPED | OUTPATIENT
Start: 2019-08-04 | End: 2019-10-03 | Stop reason: SDUPTHER

## 2019-08-20 DIAGNOSIS — F33.8 SEASONAL AFFECTIVE DISORDER (HCC): ICD-10-CM

## 2019-08-20 DIAGNOSIS — E03.9 ACQUIRED HYPOTHYROIDISM: ICD-10-CM

## 2019-08-20 RX ORDER — FLUOXETINE HYDROCHLORIDE 20 MG/1
20 CAPSULE ORAL DAILY
Qty: 30 CAPSULE | Refills: 1 | Status: SHIPPED | OUTPATIENT
Start: 2019-08-20 | End: 2019-11-02 | Stop reason: SDUPTHER

## 2019-08-20 RX ORDER — LEVOTHYROXINE SODIUM 0.05 MG/1
50 TABLET ORAL DAILY
Qty: 90 TABLET | Refills: 0 | Status: SHIPPED | OUTPATIENT
Start: 2019-08-20 | End: 2019-11-18 | Stop reason: SDUPTHER

## 2019-11-18 DIAGNOSIS — E03.9 ACQUIRED HYPOTHYROIDISM: ICD-10-CM

## 2019-11-18 RX ORDER — LEVOTHYROXINE SODIUM 0.05 MG/1
TABLET ORAL
Qty: 90 TABLET | Refills: 0 | Status: SHIPPED | OUTPATIENT
Start: 2019-11-18 | End: 2020-02-17

## 2020-01-16 RX ORDER — FLUOXETINE HYDROCHLORIDE 20 MG/1
CAPSULE ORAL
Qty: 30 CAPSULE | Refills: 0 | Status: SHIPPED | OUTPATIENT
Start: 2020-01-16 | End: 2020-02-11

## 2020-01-16 NOTE — TELEPHONE ENCOUNTER
Last OV 4/26/19 AB  Last OV 6/25/18 SM   Cx appt on 12/4/19 w/ SM  F/U not found  Please advise  Thank you

## 2020-02-17 RX ORDER — LEVOTHYROXINE SODIUM 0.05 MG/1
50 TABLET ORAL DAILY
Qty: 90 TABLET | Refills: 0 | Status: SHIPPED | OUTPATIENT
Start: 2020-02-17 | End: 2020-05-19

## 2020-02-18 ENCOUNTER — OFFICE VISIT (OUTPATIENT)
Dept: FAMILY MEDICINE CLINIC | Age: 55
End: 2020-02-18
Payer: COMMERCIAL

## 2020-02-18 VITALS
SYSTOLIC BLOOD PRESSURE: 132 MMHG | DIASTOLIC BLOOD PRESSURE: 90 MMHG | OXYGEN SATURATION: 97 % | TEMPERATURE: 97.2 F | WEIGHT: 211 LBS | BODY MASS INDEX: 32.08 KG/M2 | HEART RATE: 79 BPM | RESPIRATION RATE: 12 BRPM

## 2020-02-18 PROCEDURE — 99214 OFFICE O/P EST MOD 30 MIN: CPT | Performed by: FAMILY MEDICINE

## 2020-02-18 RX ORDER — DIFLORASONE DIACETATE 0.5 MG/G
OINTMENT TOPICAL
Qty: 15 G | Refills: 2 | Status: SHIPPED | OUTPATIENT
Start: 2020-02-18

## 2020-02-18 RX ORDER — FLUOXETINE HYDROCHLORIDE 40 MG/1
40 CAPSULE ORAL DAILY
Qty: 90 CAPSULE | Refills: 1 | Status: SHIPPED | OUTPATIENT
Start: 2020-02-18 | End: 2021-01-29

## 2020-02-18 RX ORDER — HYDROCODONE BITARTRATE AND ACETAMINOPHEN 5; 325 MG/1; MG/1
1 TABLET ORAL EVERY 6 HOURS PRN
Qty: 30 TABLET | Refills: 0 | Status: SHIPPED | OUTPATIENT
Start: 2020-02-18 | End: 2020-02-28

## 2020-02-18 RX ORDER — LISINOPRIL 10 MG/1
10 TABLET ORAL DAILY
Qty: 90 TABLET | Refills: 1 | Status: SHIPPED | OUTPATIENT
Start: 2020-02-18 | End: 2020-09-17 | Stop reason: SDUPTHER

## 2020-02-18 RX ORDER — HYDROCODONE BITARTRATE AND ACETAMINOPHEN 5; 325 MG/1; MG/1
1 TABLET ORAL EVERY 6 HOURS PRN
COMMUNITY
End: 2020-02-28

## 2020-02-18 ASSESSMENT — PATIENT HEALTH QUESTIONNAIRE - PHQ9
SUM OF ALL RESPONSES TO PHQ QUESTIONS 1-9: 4
2. FEELING DOWN, DEPRESSED OR HOPELESS: 1
SUM OF ALL RESPONSES TO PHQ QUESTIONS 1-9: 4
SUM OF ALL RESPONSES TO PHQ9 QUESTIONS 1 & 2: 4
1. LITTLE INTEREST OR PLEASURE IN DOING THINGS: 3

## 2020-02-18 NOTE — PROGRESS NOTES
Subjective:      Patient ID: Siva Callejas 48 y.o. male. The primary encounter diagnosis was Hypertriglyceridemia. Diagnoses of Acquired hypothyroidism, Elevated liver enzymes, Elevated blood pressure reading, and Facet arthropathy, lumbar were also pertinent to this visit. TRINO Benítez has seen Dr. Enoc Santos for chronic back pain and is s/p radioablation therapy. Helped; in the past month the pain is worse again. Is not exercising. Has not been doing core exercise and stretches. Exercise does help when he does it. He takes Norco when the pain flares,   Is effective in helping him to manage his pain when it is severe. Well tolerated and effective. Keeps medication secure. No constipation, abuse or misuse. Is using medication marijuana. Does not find it terribly effective. He had a severe HA at the top of his head in November. Woke him up. Occurred with stress - wife had a surgery. Associated with nausea, vomiting, photophobia, phonophobia. Never had a severe VASQUEZ like this. Has has very mild HAs with photophobia in the past.  No further HAs since then . He has not been eating well. Lost weight down to 200 lbs and regained 10 lbs. BP was elevated when he was at he dentist recently. Patient denies any exertional chest pain, dyspnea, palpitations, syncope, orthopnea, edema or paroxysmal nocturnal dyspnea. The patient denies any symptoms of neurological impairment or TIA's; no amaurosis, diplopia, dysphasia, or unilateral disturbance of motor or sensory function. No loss of balance or vertigo. No symptoms of hypo or hyperthyroidism: no decreased or increased weight, no feeling cold/chilly or excessively warm, no diarrhea or constipation, no undue sweatiness, anxiety or palpitations. He is has a lot of work stress and stress caring for a 81 yo father. Has to take him to doctor's visit. He is taking Prozac. Does not feel sad but feels lack of motivation. Anhedonia.   Appetite is fine.  Sleeping fair. Not suicidal.       Outpatient Medications Marked as Taking for the 2/18/20 encounter (Office Visit) with Maryjane Ko MD   Medication Sig Dispense Refill    HYDROcodone-acetaminophen (NORCO) 5-325 MG per tablet Take 1 tablet by mouth every 6 hours as needed for Pain.  levothyroxine (SYNTHROID) 50 MCG tablet Take 1 tablet by mouth Daily 90 tablet 0    FLUoxetine (PROZAC) 20 MG capsule TAKE 1 CAPSULE DAILY 90 capsule 0    sildenafil (VIAGRA) 50 MG tablet TAKE 1 TABLET DAILY AS NEEDED FOR ERECTILE DYSFUNCTION 18 tablet 3    cyclobenzaprine (FLEXERIL) 10 MG tablet TAKE ONE-HALF (1/2) TO ONE TABLET EVERY 8 HOURS AS NEEDED FOR MUSCLE SPASMS 90 tablet 1    ibuprofen (ADVIL;MOTRIN) 200 MG tablet Take 400 mg by mouth every 6 hours as needed for Pain      naproxen (NAPROSYN) 250 MG tablet Take 1 tablet by mouth 2 times daily (with meals) Indications: TAKING 2 EVERY DAY 60 tablet 1    diflorasone (PSORCON) 0.05 % ointment APPLY TOPICALLY DAILY 15 g 2    Multiple Vitamin (THERA) TABS Take  by mouth. No Known Allergies    Patient Active Problem List   Diagnosis    Hypertriglyceridemia    Psoriasis    Elevated blood pressure reading    Elevated liver enzymes    Cervical disc disease    Acquired hypothyroidism    Facet arthropathy, lumbar       Past Medical History:   Diagnosis Date    External hemorrhoid     Hypertriglyceridemia     Psoriasis     Sprain of thoracic region        No past surgical history on file. Family History   Problem Relation Age of Onset    Heart Disease Father         a-fib    Alzheimer's Disease Mother        Social History     Tobacco Use    Smoking status: Former Smoker     Types: E-Cigarettes    Smokeless tobacco: Current User   Substance Use Topics    Alcohol use:  Yes     Alcohol/week: 6.0 standard drinks     Types: 6 Standard drinks or equivalent per week     Comment: OCCASIONAL    Drug use: No     Types: Marijuana     Comment:

## 2020-02-18 NOTE — LETTER
could cause me to be dangerously sedated or to overdose and stop breathing. I promise to not share or give my opioid medicine to anyone else. A dose that is okay for me could cause and overdose/death in someone else. I understand that taking certain medications such as buprenorphine (Suboxone®, Subutex®), naltrexone (ReVia®), nalbuphine (Nubain®), pentazocine (Bryan Fletcher), or butorphanol (Stadol®) will reverse the effects of my pain medicines and cause me to go into withdrawal. It is my responsibility to tell any provider that is treating me or prescribing medications for me that I am taking opioid pain medications, so that they can treat me safely and avoid giving me any medicines that may interact dangerously with my pain medicines. I have discussed the possible risks and benefits of taking opioid medications for my condition(s) with my provider and have discussed the possibility of other treatments that do not use opioid medications, including:   NSAIDS, radiofrequency ablation, exercise     Naloxone is a medicine that treats opioid overdose. Administering naloxone to a person who has not taken an opioid medicine will not hurt them. Ask your healthcare provider how you can get a prescription for naloxone and tell your family where this is stored in case of emergency. These medications are being prescribed to me because other treatments have not   controlled my pain well enough. These medications are to be used to decrease my pain but they will not take away my  pain completely. These medications are to be used to help improve my ability to work, take care of myself and my family, and meet other goals that I have discussed with my provider, but if these medications do not help me meet those goals, they will be stopped. For Men: Taking opioid pain medications chronically may cause low testosterone levels   and affect sexual function. For Women: It is my responsibility to tell my provider immediately if I think I am   pregnant or if I am thinking about getting pregnant. If I become pregnant while taking these medications and continue to take the medicines during the pregnancy, the baby will be physically dependent on opioids at the time of birth and may require withdrawal treatment. When I no longer need my controlled medications, I will notify my primary care physician. I will dispose of it as quickly as possible, by taking them to a drug take-back bin. Resources for the drug disposal bins can be found at http://doan.com/.  It is no longer recommended to flush medications. I have reviewed this form with my provider and have had the chance to ask any questions. I understand each of the statements written here and by signing give my consent for treatment of my pain condition with opioid medications.        ________________________________________       2/18/2020   Signature of prescriber    ________________________________________    2/18/2020   Signature of patient                  Patient Name:  García Youssef   YOB: 1965   Medical Record Number:  [id number]

## 2020-02-19 ENCOUNTER — TELEPHONE (OUTPATIENT)
Dept: PAIN MANAGEMENT | Age: 55
End: 2020-02-19

## 2020-02-28 ENCOUNTER — OFFICE VISIT (OUTPATIENT)
Dept: PAIN MANAGEMENT | Age: 55
End: 2020-02-28
Payer: COMMERCIAL

## 2020-02-28 VITALS
HEIGHT: 68 IN | SYSTOLIC BLOOD PRESSURE: 138 MMHG | DIASTOLIC BLOOD PRESSURE: 84 MMHG | BODY MASS INDEX: 31.83 KG/M2 | WEIGHT: 210 LBS

## 2020-02-28 PROCEDURE — 99213 OFFICE O/P EST LOW 20 MIN: CPT | Performed by: ANESTHESIOLOGY

## 2020-02-28 ASSESSMENT — ENCOUNTER SYMPTOMS
COUGH: 0
EYE PAIN: 0
ABDOMINAL PAIN: 0
EYE DISCHARGE: 0
VOMITING: 0
BACK PAIN: 1
EYE REDNESS: 0
NAUSEA: 0
SHORTNESS OF BREATH: 0
CONSTIPATION: 0
WHEEZING: 0

## 2020-02-28 NOTE — PATIENT INSTRUCTIONS
Patient Education     Continue home exercises, ibuprofen as needed  You may return for the lumbar facet RF procedure as discussed    Learning About Medial Branch Block and Neurotomy  What are medial branch block and neurotomy? Facet joints connect your vertebrae to each other. Problems in these joints can cause chronic (long-term) pain in the neck or back. They can sometimes affect the shoulders, arms, buttocks, or legs. Medial branch nerves are the nerves that carry many of the pain messages from your facet joints. Radiofrequency medial branch neurotomy is a type of medial branch neurotomy that is used to relieve arthritis pain. It uses radio waves to damage nerves in your neck or back so that they can no longer send pain messages to your brain. Before your doctor knows if a neurotomy will help you, he or she will do a medial branch block to find out if certain nerves are the ones that are a source of your pain. You will need two separate visits to the outpatient center or hospital to have both procedures. How is a medial branch block done? The doctor will use a tiny needle to numb the skin where you will get the block. Then he or she puts the block needle into the numbed area. You may feel some pressure, but you should not feel pain. Using fluoroscopy (live X-ray) to guide the needle, the doctor injects medicine onto one or more nerves to make them numb. If you get relief from your pain in the next 4 to 6 hours, it's a sign that those nerves may be contributing to your pain. The relief will last only a short time. You may then have a medial branch neurotomy at a later visit to try to get longer relief. It takes 20 to 30 minutes to get the block. You can go home after the doctor watches you for about an hour. You will get instructions on how to report how much pain you have when you are at home. You will need someone to drive you home. How is medial branch neurotomy done?   The doctor will use a tiny

## 2020-02-28 NOTE — PROGRESS NOTES
[facility]  [address], [place], [address]  [phone] (T)  [phone] (f)    2/28/20        Keyana Deya  1965      Elmo Cogan, MD      Chief Complaint:   Chief Complaint   Patient presents with    Lower Back Pain     F/u on lower back pain. History of Present Illness   HPI    Last seen in 04/2019 -  Chronic low back pain with episodic flares; returns after almost a year. Low back pain episodic, achy sharp without radiation or new weakness. Pain worse with changes in weather and activities bending/twisting and helped by RF procedure. RED FLAG symptoms (Symptoms may include, but not limited to, acute trauma,fever, drug use, malignancy, weakness, perianal numbness, bladder/bowel changes) since last visit - No    Changes since last visit:  Tried lumbar facet RF in 05/2019   Currently on [place]      Past Medical History:   Diagnosis Date    External hemorrhoid     Hypertriglyceridemia     Psoriasis     Sprain of thoracic region        History reviewed. No pertinent surgical history. No Known Allergies    Current Outpatient Medications   Medication Sig Dispense Refill    diflorasone (PSORCON) 0.05 % ointment APPLY TOPICALLY DAILY 15 g 2    HYDROcodone-acetaminophen (NORCO) 5-325 MG per tablet Take 1 tablet by mouth every 6 hours as needed for Pain for up to 30 doses.  30 tablet 0    FLUoxetine (PROZAC) 40 MG capsule Take 1 capsule by mouth daily 90 capsule 1    lisinopril (PRINIVIL;ZESTRIL) 10 MG tablet Take 1 tablet by mouth daily 90 tablet 1    levothyroxine (SYNTHROID) 50 MCG tablet Take 1 tablet by mouth Daily 90 tablet 0    sildenafil (VIAGRA) 50 MG tablet TAKE 1 TABLET DAILY AS NEEDED FOR ERECTILE DYSFUNCTION 18 tablet 3    cyclobenzaprine (FLEXERIL) 10 MG tablet TAKE ONE-HALF (1/2) TO ONE TABLET EVERY 8 HOURS AS NEEDED FOR MUSCLE SPASMS 90 tablet 1    ibuprofen (ADVIL;MOTRIN) 200 MG tablet Take 400 mg by mouth every 6 hours as needed for Pain      naproxen (NAPROSYN) 250 MG tablet Take 1 tablet by mouth 2 times daily (with meals) Indications: TAKING 2 EVERY DAY 60 tablet 1    Multiple Vitamin (THERA) TABS Take  by mouth. No current facility-administered medications for this visit. Family History   Problem Relation Age of Onset    Heart Disease Father         a-fib    Alzheimer's Disease Mother        Social History     Socioeconomic History    Marital status:      Spouse name: Not on file    Number of children: Not on file    Years of education: Not on file    Highest education level: Not on file   Occupational History    Occupation:    Social Needs    Financial resource strain: Not on file    Food insecurity:     Worry: Not on file     Inability: Not on file    Transportation needs:     Medical: Not on file     Non-medical: Not on file   Tobacco Use    Smoking status: Never Smoker    Smokeless tobacco: Current User   Substance and Sexual Activity    Alcohol use:  Yes     Alcohol/week: 6.0 standard drinks     Types: 6 Standard drinks or equivalent per week     Comment: OCCASIONAL    Drug use: No     Types: Marijuana     Comment: ONLY IN COLLEGE    Sexual activity: Yes   Lifestyle    Physical activity:     Days per week: Not on file     Minutes per session: Not on file    Stress: Not on file   Relationships    Social connections:     Talks on phone: Not on file     Gets together: Not on file     Attends [employer] service: Not on file     Active member of club or organization: Not on file     Attends meetings of clubs or organizations: Not on file     Relationship status: Not on file    Intimate partner violence:     Fear of current or ex partner: Not on file     Emotionally abused: Not on file     Physically abused: Not on file     Forced sexual activity: Not on file   Other Topics Concern    Not on file   Social History Narrative    Not on file         Imaging

## 2020-08-19 RX ORDER — LEVOTHYROXINE SODIUM 0.05 MG/1
TABLET ORAL
Qty: 90 TABLET | Refills: 0 | Status: SHIPPED | OUTPATIENT
Start: 2020-08-19 | End: 2020-09-02

## 2020-09-01 ENCOUNTER — TELEPHONE (OUTPATIENT)
Dept: FAMILY MEDICINE CLINIC | Age: 55
End: 2020-09-01

## 2020-09-02 RX ORDER — LEVOTHYROXINE SODIUM 0.07 MG/1
75 TABLET ORAL DAILY
Qty: 90 TABLET | Refills: 0 | Status: SHIPPED | OUTPATIENT
Start: 2020-09-02 | End: 2020-11-16

## 2020-09-14 ENCOUNTER — OFFICE VISIT (OUTPATIENT)
Dept: PRIMARY CARE CLINIC | Age: 55
End: 2020-09-14
Payer: COMMERCIAL

## 2020-09-14 PROCEDURE — 99211 OFF/OP EST MAY X REQ PHY/QHP: CPT | Performed by: NURSE PRACTITIONER

## 2020-09-14 NOTE — PROGRESS NOTES
Arun Seo received a viral test for COVID-19. They were educated on isolation and quarantine as appropriate. For any symptoms, they were directed to seek care from their PCP, given contact information to establish with a doctor, directed to an urgent care or the emergency room.

## 2020-09-14 NOTE — PATIENT INSTRUCTIONS
Advance Care Planning  People with COVID-19 may have no symptoms, mild symptoms, such as fever, cough, and shortness of breath or they may have more severe illness, developing severe and fatal pneumonia. As a result, Advance Care Planning with attention to naming a health care decision maker (someone you trust to make healthcare decisions for you if you could not speak for yourself) and sharing other health care preferences is important BEFORE a possible health crisis. Please contact your Primary Care Provider to discuss Advance Care Planning. Preventing the Spread of Coronavirus Disease 2019 in Homes and Residential Communities  For the most recent information go to [email or web address]    Prevention steps for People with confirmed or suspected COVID-19 (including persons under investigation) who do not need to be hospitalized  and   People with confirmed COVID-19 who were hospitalized and determined to be medically stable to go home    Your healthcare provider and public health staff will evaluate whether you can be cared for at home. If it is determined that you do not need to be hospitalized and can be isolated at home, you will be monitored by staff from your local or state health department. You should follow the prevention steps below until a healthcare provider or local or state health department says you can return to your normal activities. Stay home except to get medical care  People who are mildly ill with COVID-19 are able to isolate at home during their illness. You should restrict activities outside your home, except for getting medical care. Do not go to work, school, or public areas. Avoid using public transportation, ride-sharing, or taxis. Separate yourself from other people and animals in your home  People: As much as possible, you should stay in a specific room and away from other people in your home.  Also, you should use a separate before eating or preparing food. If soap and water are not readily available, use an alcohol-based hand  with at least 60% alcohol, covering all surfaces of your hands and rubbing them together until they feel dry. Soap and water are the best option if hands are visibly dirty. Avoid touching your eyes, nose, and mouth with unwashed hands. Avoid sharing personal household items  You should not share dishes, drinking glasses, cups, eating utensils, towels, or bedding with other people or pets in your home. After using these items, they should be washed thoroughly with soap and water. Clean all high-touch surfaces everyday  High touch surfaces include counters, tabletops, doorknobs, bathroom fixtures, toilets, phones, keyboards, tablets, and bedside tables. Also, clean any surfaces that may have blood, stool, or body fluids on them. Use a household cleaning spray or wipe, according to the label instructions. Labels contain instructions for safe and effective use of the cleaning product including precautions you should take when applying the product, such as wearing gloves and making sure you have good ventilation during use of the product. Monitor your symptoms  Seek prompt medical attention if your illness is worsening (e.g., difficulty breathing). Before seeking care, call your healthcare provider and tell them that you have, or are being evaluated for, COVID-19. Put on a facemask before you enter the facility. These steps will help the healthcare providers office to keep other people in the office or waiting room from getting infected or exposed. Ask your healthcare provider to call the local or state health department. Persons who are placed under active monitoring or facilitated self-monitoring should follow instructions provided by their local health department or occupational health professionals, as appropriate. When working with your local health department check their available hours.   If you have a medical emergency and need to call 911, notify the dispatch personnel that you have, or are being evaluated for COVID-19. If possible, put on a facemask before emergency medical services arrive. Discontinuing home isolation  Patients with confirmed COVID-19 should remain under home isolation precautions until the risk of secondary transmission to others is thought to be low. The decision to discontinue home isolation precautions should be made on a case-by-case basis, in consultation with healthcare providers and state and local health departments.

## 2020-09-16 LAB — SARS-COV-2, NAA: NOT DETECTED

## 2020-09-17 RX ORDER — LISINOPRIL 10 MG/1
10 TABLET ORAL DAILY
Qty: 90 TABLET | Refills: 1 | Status: SHIPPED | OUTPATIENT
Start: 2020-09-17 | End: 2021-01-29 | Stop reason: SDUPTHER

## 2020-09-17 NOTE — TELEPHONE ENCOUNTER
Requested Prescriptions     Pending Prescriptions Disp Refills    lisinopril (PRINIVIL;ZESTRIL) 10 MG tablet 90 tablet 1     Sig: Take 1 tablet by mouth daily     LOV:2/18/2020  FOV:NONE

## 2020-10-19 RX ORDER — SILDENAFIL 50 MG/1
TABLET, FILM COATED ORAL
Qty: 18 TABLET | Refills: 7 | Status: SHIPPED | OUTPATIENT
Start: 2020-10-19 | End: 2022-04-05 | Stop reason: SDUPTHER

## 2020-11-16 RX ORDER — LEVOTHYROXINE SODIUM 0.07 MG/1
75 TABLET ORAL DAILY
Qty: 90 TABLET | Refills: 0 | Status: SHIPPED | OUTPATIENT
Start: 2020-11-16 | End: 2021-01-27

## 2020-12-07 RX ORDER — CYCLOBENZAPRINE HCL 10 MG
TABLET ORAL
Qty: 90 TABLET | Refills: 1 | Status: SHIPPED | OUTPATIENT
Start: 2020-12-07 | End: 2021-04-19

## 2021-01-26 ENCOUNTER — TELEPHONE (OUTPATIENT)
Dept: FAMILY MEDICINE CLINIC | Age: 56
End: 2021-01-26

## 2021-01-26 NOTE — TELEPHONE ENCOUNTER
Left message on voicemail to contact the office to schedule OV for elbow pain + shingles vaccine + flu vaccine. At this time there are several openings with availability in the patients preferred date range: 1/25/21 - 2/5/21.

## 2021-01-26 NOTE — TELEPHONE ENCOUNTER
Yes we have Shingrix  2 have been place back for pt  Smitha BOGGS will schedule pt    Lab order faxed to [facility] [phone]

## 2021-01-26 NOTE — TELEPHONE ENCOUNTER
Message received from patient:    Preferred Date Range: From 1/25/2021 To 2/5/2021     Preferred Times: Any     Reason: To address the following health maintenance concerns. Shingles Vaccine  Flu Vaccine     Comments:  Could your office send blood work lab order to [facility] on [address] in [place] for Thyroid and other normal blood tests? Also, I have had a persistent pain in my elbow for 6 months that does not seem to be going away at all. Thanks. Do we have a 1st dose shingles vaccine for this patient? Can requested blood work orders be sent to [facility] in [place] (fax # [phone])? I will schedule patient for OV for persistent elbow pain + shingles vaccine + flu vaccine once I know we have the shingles vaccine available.

## 2021-01-28 NOTE — PROGRESS NOTES
LABVLDL 33 09/01/2020    LABVLDL 38 05/11/2018    LABVLDL 35 09/19/2016     No results found for: CHOLHDLRATIO  TSH   Date Value Ref Range Status   01/27/2021 3.630 0.450 - 4.500 uIU/mL Final   09/01/2020 11.300 (H) 0.450 - 4.500 uIU/mL Final   05/11/2018 8.16 (H) 0.27 - 4.20 uIU/mL Final   09/19/2016 14.24 (H) 0.27 - 4.20 uIU/mL Final         Outpatient Medications Marked as Taking for the 1/29/21 encounter (Office Visit) with Chantel Carcamo MD   Medication Sig Dispense Refill    levothyroxine (SYNTHROID) 75 MCG tablet TAKE 1 TABLET DAILY 90 tablet 0    cyclobenzaprine (FLEXERIL) 10 MG tablet TAKE ONE-HALF (1/2) TO ONE TABLET EVERY 8 HOURS AS NEEDED FOR MUSCLE SPASMS 90 tablet 1    sildenafil (VIAGRA) 50 MG tablet TAKE 1 TABLET DAILY AS NEEDED FOR ERECTILE DYSFUNCTION 18 tablet 7    lisinopril (PRINIVIL;ZESTRIL) 10 MG tablet Take 1 tablet by mouth daily 90 tablet 1    diflorasone (PSORCON) 0.05 % ointment APPLY TOPICALLY DAILY 15 g 2    ibuprofen (ADVIL;MOTRIN) 200 MG tablet Take 400 mg by mouth every 6 hours as needed for Pain      naproxen (NAPROSYN) 250 MG tablet Take 1 tablet by mouth 2 times daily (with meals) Indications: TAKING 2 EVERY DAY 60 tablet 1    Multiple Vitamin (THERA) TABS Take  by mouth. No Known Allergies    Patient Active Problem List   Diagnosis    Hypertriglyceridemia    Psoriasis    Elevated blood pressure reading    Elevated liver enzymes    Cervical disc disease    Acquired hypothyroidism    Facet arthropathy, lumbar       Past Medical History:   Diagnosis Date    External hemorrhoid     Hypertriglyceridemia     Psoriasis     Sprain of thoracic region        No past surgical history on file.      Family History   Problem Relation Age of Onset    Heart Disease Father         a-fib    Alzheimer's Disease Mother        Social History     Tobacco Use    Smoking status: Never Smoker    Smokeless tobacco: Current User   Substance Use Topics  Alcohol use: Yes     Alcohol/week: 6.0 standard drinks     Types: 6 Standard drinks or equivalent per week     Comment: OCCASIONAL    Drug use: No     Types: Marijuana     Comment: ONLY IN [facility]            Review of Systems  Review of Systems    Objective:   Physical Exam  Vitals:    01/29/21 0920   BP: 120/69   Pulse: 74   Resp: 14   Temp: 96.7 °F (35.9 °C)   TempSrc: Temporal   SpO2: 98%   Weight: 220 lb (99.8 kg)   Height: 5' 8\" (1.727 m)       Physical Exam    NAD    Skin is warm and dry. No rash. Well hydrated  Alert and oriented x 3. Mood and affect are normal.  The neck is supple and free of adenopathy or masses, the thyroid is normal without enlargement or nodules. Chest: clear with no wheezes or rales. No retractions, or use of accessory muscles noted. Cardiovascular: PMI is not displaced, and no thrill noted. Regular rate and rhythm with no rub, murmur or gallop. No peripheral edema. No carotid bruits. The abdomen is soft without tenderness, guarding, mass, rebound or organomegaly. Aorta, femoral, DP and PT pulses intact. + tender bilateral lateral epicondyle. No atrophy or swelling. Full AROM. Strength 5/5 , opposition finger and thumb, , elbow. Assessment:       Diagnosis Orders   1. Lateral epicondylitis of both elbows  Elastic Bandages & Supports (ACE TENNIS ELBOW BRACE) MISC    [facility] PT - [facility]   2. Essential hypertension  lisinopril (PRINIVIL;ZESTRIL) 10 MG tablet  At goal < 130/80   3. Need for influenza vaccination  INFLUENZA, QUADV, 0.5ML, 6 MO AND OLDER, IM, PF, PREFILL SYR OR SDV (FLUZONE QUADV, PF)   4. Need for shingles vaccine  Zoster recombinant [facility])  Repeat 2 to 6 mos         Side effects of current medications reviewed and questions answered. Plan:      Side effects of current medications reviewed and questions answered. Follow up in 6 months or prn.

## 2021-01-29 ENCOUNTER — OFFICE VISIT (OUTPATIENT)
Dept: FAMILY MEDICINE CLINIC | Age: 56
End: 2021-01-29
Payer: COMMERCIAL

## 2021-01-29 VITALS
RESPIRATION RATE: 14 BRPM | DIASTOLIC BLOOD PRESSURE: 69 MMHG | HEIGHT: 68 IN | SYSTOLIC BLOOD PRESSURE: 120 MMHG | TEMPERATURE: 96.7 F | HEART RATE: 74 BPM | OXYGEN SATURATION: 98 % | WEIGHT: 220 LBS | BODY MASS INDEX: 33.34 KG/M2

## 2021-01-29 DIAGNOSIS — Z23 NEED FOR SHINGLES VACCINE: ICD-10-CM

## 2021-01-29 DIAGNOSIS — M77.12 LATERAL EPICONDYLITIS OF BOTH ELBOWS: Primary | ICD-10-CM

## 2021-01-29 DIAGNOSIS — M77.11 LATERAL EPICONDYLITIS OF BOTH ELBOWS: Primary | ICD-10-CM

## 2021-01-29 DIAGNOSIS — Z23 NEED FOR INFLUENZA VACCINATION: ICD-10-CM

## 2021-01-29 DIAGNOSIS — I10 ESSENTIAL HYPERTENSION: ICD-10-CM

## 2021-01-29 PROCEDURE — 99214 OFFICE O/P EST MOD 30 MIN: CPT | Performed by: FAMILY MEDICINE

## 2021-01-29 PROCEDURE — 90750 HZV VACC RECOMBINANT IM: CPT | Performed by: FAMILY MEDICINE

## 2021-01-29 PROCEDURE — 90472 IMMUNIZATION ADMIN EACH ADD: CPT | Performed by: FAMILY MEDICINE

## 2021-01-29 PROCEDURE — 90686 IIV4 VACC NO PRSV 0.5 ML IM: CPT | Performed by: FAMILY MEDICINE

## 2021-01-29 PROCEDURE — 90471 IMMUNIZATION ADMIN: CPT | Performed by: FAMILY MEDICINE

## 2021-01-29 RX ORDER — ARM BRACE
2 EACH MISCELLANEOUS DAILY
Qty: 1 EACH | Refills: 0 | Status: SHIPPED | OUTPATIENT
Start: 2021-01-29

## 2021-01-29 RX ORDER — LISINOPRIL 10 MG/1
10 TABLET ORAL DAILY
Qty: 90 TABLET | Refills: 1 | Status: SHIPPED | OUTPATIENT
Start: 2021-01-29 | End: 2021-10-19

## 2021-01-29 SDOH — ECONOMIC STABILITY: TRANSPORTATION INSECURITY
IN THE PAST 12 MONTHS, HAS THE LACK OF TRANSPORTATION KEPT YOU FROM MEDICAL APPOINTMENTS OR FROM GETTING MEDICATIONS?: NO

## 2021-01-29 SDOH — ECONOMIC STABILITY: FOOD INSECURITY: WITHIN THE PAST 12 MONTHS, YOU WORRIED THAT YOUR FOOD WOULD RUN OUT BEFORE YOU GOT MONEY TO BUY MORE.: NEVER TRUE

## 2021-01-29 SDOH — ECONOMIC STABILITY: FOOD INSECURITY: WITHIN THE PAST 12 MONTHS, THE FOOD YOU BOUGHT JUST DIDN'T LAST AND YOU DIDN'T HAVE MONEY TO GET MORE.: NEVER TRUE

## 2021-01-29 SDOH — ECONOMIC STABILITY: INCOME INSECURITY: HOW HARD IS IT FOR YOU TO PAY FOR THE VERY BASICS LIKE FOOD, HOUSING, MEDICAL CARE, AND HEATING?: NOT HARD AT ALL

## 2021-01-29 SDOH — ECONOMIC STABILITY: TRANSPORTATION INSECURITY
IN THE PAST 12 MONTHS, HAS LACK OF TRANSPORTATION KEPT YOU FROM MEETINGS, WORK, OR FROM GETTING THINGS NEEDED FOR DAILY LIVING?: NO

## 2021-01-29 NOTE — PATIENT INSTRUCTIONS
Patient Education        Tennis Elbow: Exercises  Introduction  Here are some examples of exercises for you to try. The exercises may be suggested for a condition or for rehabilitation. Start each exercise slowly. Ease off the exercises if you start to have pain. You will be told when to start these exercises and which ones will work best for you. How to do the exercises  Wrist flexor stretch   1. Extend your arm in front of you with your palm up. 2. Bend your wrist, pointing your hand toward the floor. 3. With your other hand, gently bend your wrist farther until you feel a mild to moderate stretch in your forearm. 4. Hold for at least 15 to 30 seconds. Repeat 2 to 4 times. Wrist extensor stretch   1. Repeat steps 1 to 4 of the stretch above but begin with your extended hand palm down. Ball or sock squeeze   1. Hold a tennis ball (or a rolled-up sock) in your hand. 2. Make a fist around the ball (or sock) and squeeze. 3. Hold for about 6 seconds, and then relax for up to 10 seconds. 4. Repeat 8 to 12 times. 5. Switch the ball (or sock) to your other hand and do 8 to 12 times. Wrist deviation   1. Sit so that your arm is supported but your hand hangs off the edge of a flat surface, such as a table. 2. Hold your hand out like you are shaking hands with someone. 3. Move your hand up and down. 4. Repeat this motion 8 to 12 times. 5. Switch arms. 6. Try to do this exercise twice with each hand. Wrist curls   1. Place your forearm on a table with your hand hanging over the edge of the table, palm up. 2. Place a 1- to 2-pound weight in your hand. This may be a dumbbell, a can of food, or a filled water bottle. 3. Slowly raise and lower the weight while keeping your forearm on the table and your palm facing up. 4. Repeat this motion 8 to 12 times. 5. Switch arms, and do steps 1 through 4.  6. Repeat with your hand facing down toward the floor. Switch arms.     Biceps curls 1. Sit leaning forward with your legs slightly spread and your left hand on your left thigh. 2. Place your right elbow on your right thigh, and hold the weight with your forearm horizontal.  3. Slowly curl the weight up and toward your chest.  4. Repeat this motion 8 to 12 times. 5. Switch arms, and do steps 1 through 4. Follow-up care is a key part of your treatment and safety. Be sure to make and go to all appointments, and call your doctor if you are having problems. It's also a good idea to know your test results and keep a list of the medicines you take. Where can you learn more? Go to https://[employer]. org and sign in to your [employer] account. Enter S703 in the Telller box to learn more about \"Tennis Elbow: Exercises. \"     If you do not have an account, please click on the \"Sign Up Now\" link. Current as of: March 2, 2020               Content Version: 12.6  © 2006-2020 [employer]. Care instructions adapted under license by [facility] ([facility]). If you have questions about a medical condition or this instruction, always ask your healthcare professional. [place] [address] any warranty or liability for your use of this information.

## 2021-04-17 DIAGNOSIS — M50.90 CERVICAL DISC DISEASE: ICD-10-CM

## 2021-04-19 RX ORDER — CYCLOBENZAPRINE HCL 10 MG
TABLET ORAL
Qty: 90 TABLET | Refills: 11 | Status: SHIPPED | OUTPATIENT
Start: 2021-04-19 | End: 2022-10-19 | Stop reason: SDUPTHER

## 2021-08-11 ENCOUNTER — OFFICE VISIT (OUTPATIENT)
Dept: FAMILY MEDICINE CLINIC | Age: 56
End: 2021-08-11
Payer: COMMERCIAL

## 2021-08-11 VITALS
OXYGEN SATURATION: 99 % | BODY MASS INDEX: 31.81 KG/M2 | HEART RATE: 80 BPM | SYSTOLIC BLOOD PRESSURE: 118 MMHG | DIASTOLIC BLOOD PRESSURE: 82 MMHG | RESPIRATION RATE: 12 BRPM | TEMPERATURE: 97 F | WEIGHT: 209.2 LBS

## 2021-08-11 DIAGNOSIS — M50.90 CERVICAL DISC DISEASE: ICD-10-CM

## 2021-08-11 DIAGNOSIS — Z87.891 FORMER SMOKER: ICD-10-CM

## 2021-08-11 DIAGNOSIS — R22.0 SCALP MASS: Primary | ICD-10-CM

## 2021-08-11 DIAGNOSIS — R74.8 ELEVATED LIVER ENZYMES: ICD-10-CM

## 2021-08-11 DIAGNOSIS — I10 ESSENTIAL HYPERTENSION: ICD-10-CM

## 2021-08-11 DIAGNOSIS — E03.9 ACQUIRED HYPOTHYROIDISM: ICD-10-CM

## 2021-08-11 DIAGNOSIS — E78.1 HYPERTRIGLYCERIDEMIA: ICD-10-CM

## 2021-08-11 PROCEDURE — G8417 CALC BMI ABV UP PARAM F/U: HCPCS | Performed by: FAMILY MEDICINE

## 2021-08-11 PROCEDURE — G8427 DOCREV CUR MEDS BY ELIG CLIN: HCPCS | Performed by: FAMILY MEDICINE

## 2021-08-11 PROCEDURE — 4004F PT TOBACCO SCREEN RCVD TLK: CPT | Performed by: FAMILY MEDICINE

## 2021-08-11 PROCEDURE — 3017F COLORECTAL CA SCREEN DOC REV: CPT | Performed by: FAMILY MEDICINE

## 2021-08-11 PROCEDURE — 99214 OFFICE O/P EST MOD 30 MIN: CPT | Performed by: FAMILY MEDICINE

## 2021-08-11 NOTE — PROGRESS NOTES
Patient is here for lump to back of head X 3 weeks. Slight tenderness. Slightly bigger. No drainage. No trauma. A best friend got diagnosed stage 4 lung cancer. Patient is a former smoker - 1 ppd X 20+ years. Quit smoking 5+ years ago. Denies chest pain , shortness of breath or cough or wheezing. 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 Review of Systems    ROS: All other systems were reviewed and are negative . Patient's allergies and medications were reviewed. Patient's past medical, surgical, social , and family history were reviewed. OBJECTIVE:  /82   Pulse 80   Temp 97 °F (36.1 °C)   Resp 12   Wt 209 lb 3.2 oz (94.9 kg)   SpO2 99%   BMI 31.81 kg/m²     Physical Exam    General: NAD, cooperative, alert and oriented X 3. Mood / affect is good. good insight. well hydrated. Neck : no lymphadenopathy, supple, FROM  CV: Regular rate and rhythm , no murmurs/ rub/ gallop. No edema. Lungs : CTA bilaterally, breathing comfortably  Abdomen: positive bowel sounds, soft , non tender, non distended. No hepatosplenomegaly. No CVA tenderness. Skin: no rashes. Non tender. Skin : scalp - right side X 1.5 cm . Slightly raised . Mobile . Superficial to scalp. ASSESSMENT/  PLAN:  1. Scalp mass  - likely sebaceous cyst.   - monitor and follow up if increased size/symptoms/ redness occurs. 2. Former smoker  - [address] (Initial or Annual); Future and follow up after completed/ prn.     3. Hypertriglyceridemia  - Follow a low cholesterol diet, including cardiovascular exercise > 150 minutes/ week. - Lipid Panel; Future  - Comprehensive Metabolic Panel; Future    4. Elevated liver enzymes  - Comprehensive Metabolic Panel; Future    5. Acquired hypothyroidism  - Stable. Continue Synthroid 0.075 mg qd.   - TSH without Reflex; Future  - T4, Free; Future    6.  Essential hypertension  - Controlled. Continue Lisinopril 10 mg qd and low sodium diet. - CBC; Future  - Comprehensive Metabolic Panel; Future    7. Cervical disc disease  - Moist heat . ROM exercises. Modify activities. Follow up 6 months/ prn.

## 2021-10-18 DIAGNOSIS — I10 ESSENTIAL HYPERTENSION: ICD-10-CM

## 2021-10-19 RX ORDER — LISINOPRIL 10 MG/1
TABLET ORAL
Qty: 90 TABLET | Refills: 0 | Status: SHIPPED | OUTPATIENT
Start: 2021-10-19 | End: 2022-01-25

## 2021-10-19 NOTE — TELEPHONE ENCOUNTER
Requested Prescriptions     Pending Prescriptions Disp Refills    lisinopril (PRINIVIL;ZESTRIL) 10 MG tablet [Pharmacy Med Name: LISINOPRIL 10 MG TABLET] 90 tablet 1     Sig: TAKE ONE TABLET BY MOUTH DAILY     Last ov 1/29/2021  Last labs 8/11/2021

## 2021-12-14 ENCOUNTER — PATIENT MESSAGE (OUTPATIENT)
Dept: FAMILY MEDICINE CLINIC | Age: 56
End: 2021-12-14

## 2021-12-14 DIAGNOSIS — S76.109A INJURY OF QUADRICEPS MUSCLE: Primary | ICD-10-CM

## 2021-12-15 ENCOUNTER — TELEPHONE (OUTPATIENT)
Dept: ORTHOPEDIC SURGERY | Age: 56
End: 2021-12-15

## 2021-12-15 ENCOUNTER — OFFICE VISIT (OUTPATIENT)
Dept: ORTHOPEDIC SURGERY | Age: 56
End: 2021-12-15
Payer: COMMERCIAL

## 2021-12-15 VITALS — WEIGHT: 215 LBS | HEIGHT: 68 IN | BODY MASS INDEX: 32.58 KG/M2 | RESPIRATION RATE: 16 BRPM

## 2021-12-15 DIAGNOSIS — M25.562 ACUTE PAIN OF LEFT KNEE: Primary | ICD-10-CM

## 2021-12-15 DIAGNOSIS — F40.240 CLAUSTROPHOBIA: ICD-10-CM

## 2021-12-15 DIAGNOSIS — S76.112A QUADRICEPS MUSCLE RUPTURE, LEFT, INITIAL ENCOUNTER: ICD-10-CM

## 2021-12-15 PROCEDURE — 4004F PT TOBACCO SCREEN RCVD TLK: CPT | Performed by: PHYSICIAN ASSISTANT

## 2021-12-15 PROCEDURE — G8427 DOCREV CUR MEDS BY ELIG CLIN: HCPCS | Performed by: PHYSICIAN ASSISTANT

## 2021-12-15 PROCEDURE — 3017F COLORECTAL CA SCREEN DOC REV: CPT | Performed by: PHYSICIAN ASSISTANT

## 2021-12-15 PROCEDURE — G8484 FLU IMMUNIZE NO ADMIN: HCPCS | Performed by: PHYSICIAN ASSISTANT

## 2021-12-15 PROCEDURE — L1830 KO IMMOB CANVAS LONG PRE OTS: HCPCS | Performed by: PHYSICIAN ASSISTANT

## 2021-12-15 PROCEDURE — G8417 CALC BMI ABV UP PARAM F/U: HCPCS | Performed by: PHYSICIAN ASSISTANT

## 2021-12-15 PROCEDURE — 99204 OFFICE O/P NEW MOD 45 MIN: CPT | Performed by: PHYSICIAN ASSISTANT

## 2021-12-15 RX ORDER — DIAZEPAM 10 MG/1
10 TABLET ORAL PRN
Qty: 2 TABLET | Refills: 0 | Status: SHIPPED | OUTPATIENT
Start: 2021-12-15 | End: 2021-12-31

## 2021-12-15 RX ORDER — HYDROCODONE BITARTRATE AND ACETAMINOPHEN 5; 325 MG/1; MG/1
1 TABLET ORAL EVERY 6 HOURS PRN
Qty: 28 TABLET | Refills: 0 | Status: SHIPPED | OUTPATIENT
Start: 2021-12-15 | End: 2021-12-22

## 2021-12-15 NOTE — PROGRESS NOTES
Subjective:      Patient ID: Andrew Echeverria is a 49 y.o. male who is here for an initial evaluation of anterior thigh pain. He states he was carrying a heavy object down a hill when he felt a pop in his left anterior distal thigh. He felt a pop and then he did fall to the ground because of pain. He now reports a sense of weakness in his left anterior thigh and knee. Pain Scale 6/10 VAS. Location of pain right anterior distal one third thigh. Pain is worse with ambulation. Pain improves with elevation. Previous treatments have included cyclobenzaprine for muscle spasm and ibuprofen without relief. Review of Systems:  I have reviewed the clinically relevant past medical history, medications, allergies, family history, social history, and 13 point Review of Systems from the patient's recent history form & documented any details relevant to today's presenting complaints in the history above. The patient's self-reported past medical history, medications, allergies, family history, social history, and Review of Systems form from today's date have been scanned into the chart under the \"Media\" tab. As outlined in the HPI. Negative for fever or chills. Negative for poly-joint pain, swelling and stiffness. Negative for numbness or tingling into the affected extremity. Past Medical History:   Diagnosis Date    External hemorrhoid     Hypertriglyceridemia     Psoriasis     Sprain of thoracic region        Family History   Problem Relation Age of Onset    Heart Disease Father         a-fib    Alzheimer's Disease Mother        History reviewed. No pertinent surgical history. Social History     Occupational History    Occupation:    Tobacco Use    Smoking status: Never Smoker    Smokeless tobacco: Current User   Vaping Use    Vaping Use: Every day    Substances: Always   Substance and Sexual Activity    Alcohol use:  Yes     Alcohol/week: 6.0 standard drinks     Types: 6 Standard drinks or equivalent per week     Comment: OCCASIONAL    Drug use: No     Types: Marijuana (Weed)     Comment: ONLY IN [place]    Sexual activity: Yes       Current Outpatient Medications   Medication Sig Dispense Refill    HYDROcodone-acetaminophen (NORCO) 5-325 MG per tablet Take 1 tablet by mouth every 6 hours as needed for Pain for up to 7 days. Take the least amount necessary to control pain. Do not operate machinery while taking this medication. Sedation Warning 28 tablet 0    diazePAM (VALIUM) 10 MG tablet Take 1 tablet by mouth as needed for Anxiety (may take 45 mins prior to MRI. May repeat x 1 as needed.) for up to 2 doses. 2 tablet 0    lisinopril (PRINIVIL;ZESTRIL) 10 MG tablet TAKE ONE TABLET BY MOUTH DAILY 90 tablet 0    levothyroxine (SYNTHROID) 75 MCG tablet TAKE 1 TABLET DAILY 90 tablet 1    cyclobenzaprine (FLEXERIL) 10 MG tablet TAKE ONE-HALF (1/2) TO ONE TABLET EVERY 8 HOURS AS NEEDED FOR MUSCLE SPASMS 90 tablet 11    FLUoxetine (PROZAC) 20 MG capsule TAKE 1 CAPSULE DAILY 90 capsule 1    Elastic Bandages & Supports (ACE TENNIS ELBOW BRACE) MISC 2 each by Does not apply route daily 1 each 0    sildenafil (VIAGRA) 50 MG tablet TAKE 1 TABLET DAILY AS NEEDED FOR ERECTILE DYSFUNCTION 18 tablet 7    diflorasone (PSORCON) 0.05 % ointment APPLY TOPICALLY DAILY 15 g 2    ibuprofen (ADVIL;MOTRIN) 200 MG tablet Take 400 mg by mouth every 6 hours as needed for Pain (Patient not taking: Reported on 8/11/2021)      naproxen (NAPROSYN) 250 MG tablet Take 1 tablet by mouth 2 times daily (with meals) Indications: TAKING 2 EVERY DAY (Patient not taking: Reported on 8/11/2021) 60 tablet 1    Multiple Vitamin (THERA) TABS Take  by mouth. No current facility-administered medications for this visit. No Known Allergies      Objective:     He is alert, oriented x 3, pleasant, well nourished, developed and in no acute distress.      Resp 16   Ht 5' 8\" (1.727 m)   Wt 215 lb (97.5 kg)   BMI 32.69 kg/m²      Examination of the left knee: Inspection of the skin no erythema. Inspection of the soft tissues mild swelling distal thigh. The overall alignment of the knee is neutral.  Gait is antalgic. There is minimal intra-articular effusion. AROM:           PROM:  Extension-         5                   0  Flexion -           115                   120  There mild pain associated with ROM testing. Medial joint line is non-tender to palpation. Lateral joint line is non-tender to palpation. Pes anserine bursa is non-tender to palpation. Patellar tendon is non-tender to palpation. Quadriceps tendon is tender to palpation. However, there is no palpable defect. He has more tenderness over the distal one third of the thigh within the region of the VMO. Collateral ligaments non-tender to palpation. Extensor Mechanism appears to be intact. Lower extremities:  He has 5/5 motor strength of bilateral lower extremities. He has a negative straight leg raise, bilaterally. Deep tendon reflexes at knees and achilles are 2+. Sensation is intact to light touch L3 to S1 bilaterally. He has no clonus. Examination of the lower extremities shows intact perfusion to both lower extremities. He has no cyanosis and digigts are warm to touch, capillary refill is less than 2 seconds. He has no edema noted. He has intact skin without lacerations or abrasions, no significant erythema, rashes or skin lesions. X Rays: were performed in the office today:   AP Standing,Lateral and Sunrise of left Knee: Normal radiographic study. The alignment is anatomic. There are no radiographic findings to suggest fracture or dislocation. Additional Tests reviewed: none  Additional Outside Records reviewed: none    Diagnosis:       ICD-10-CM    1.  Acute pain of left knee  M25.562 XR KNEE LEFT (3 VIEWS)     HYDROcodone-acetaminophen (NORCO) 5-325 MG per tablet     diazePAM (VALIUM) 10 MG tablet     MRI KNEE LEFT WO CONTRAST     Breg Knee Immobilizer Brace   2. Quadriceps muscle rupture, left, initial encounter  S76.112A    3. Claustrophobia  F40.240         Assessment and Plan:       Assessment:  I believe he may have a intersubstance quadriceps muscle tear. I cannot exclude a possible quadriceps tendon tear. I had an extensive discussion with Mr. Dhiraj Bentiez regarding the natural history, etiology, and long term consequences of his condition. I have presented reasonable alternatives to the patient's proposed care, treatment, and services. Risks and benefits of the treatment options also reviewed in detail. I have outlined a treatment plan with them. He has had full opportunity to ask his questions. I have answered them all to his satisfaction. I feel that Mr. Dhiraj Benitez understands our discussion today       Plan:  Medications- OTC NSAIDS discussed. He  was advised that NSAID-type medications have several important potential side effects: gastrointestinal irritation including hemorrhage, cardiac events and renal injuries. He was asked to take the medication with food and to stop if he experiences any GI upset. I asked him to call for vomiting, abdominal pain or black/bloody stools. He should have renal function testing per his medical provider periodically. He expresses understanding of these risks associated with NSAID use and questions were answered. Norco prescribed for night pain. Valium prescribed for claustrophobia. Controlled substances monitoring: possible medication side effects, risk of tolerance and/or dependence, and alternative treatments discussed and OARRS report reviewed today- activity consistent with treatment plan. Further Imaging-stat MRI of the left thigh to evaluate for partial quadriceps muscle tear versus quadriceps tendon tear. Procedures-he will be treated in a knee immobilizer.       Procedures    Breg Knee Immobilizer Brace     Patient was prescribed a Breg Knee Immobilizer. The left knee will require stabilization / immobilization from this semi-rigid / rigid orthosis to improve their function. The orthosis will assist in protecting the affected area, provide functional support and facilitate healing. The prefabricated orthosis was modified in the following manner to provide a customizable fit for the patient at the time of delivery. 1. Identification of appropriate positioning and alignment of anatomical landmarks. 2. Trimming of straps and panels. Reassemble orthosis to specifically fit patient. The patient was educated and fit by a healthcare professional with expert knowledge and specialization in brace application while under the direct supervision of the treating physician. Verbal and written instructions for the use of and application of this item were provided. They were instructed to contact the office immediately should the brace result in increased pain, decreased sensation, increased swelling or worsening of the condition. Follow up-he will be called after the MRI to discuss results and further treatment options based on MRI results  Call or return to clinic if these symptoms worsen or fail to improve as anticipated. Marquis Mullins PA-C   Senior Physician Assistant   [facility]                                         Disclaimer: This note was generated with use of a verbal recognition program (DRAGON) and an attempt was made to check for errors. It is possible that there are still dictated errors within this office note. If so, please bring any significant errors to my attention for an addendum. All efforts were made to ensure that this office note is accurate.

## 2021-12-15 NOTE — TELEPHONE ENCOUNTER
General Question     Subject: PATIENT WOULD LIKE THE ORDER FOR THE MRI - LEFT LEG - TO BE SENT VIA FAX TO:    FAX NO.:  [phone]    AFFORDABLE IMAGING    PATIENT STATES HE HAS AN APPOINTMENT ON Thursday, December 16, 2021 AT 12:5 P.M.       Patient:  Rosibel Ramon  Contact Number: [phone]

## 2021-12-15 NOTE — TELEPHONE ENCOUNTER
[facility] and I faxed the demo, ins card, dict and order to them, they will precert and call pt

## 2021-12-16 ENCOUNTER — TELEPHONE (OUTPATIENT)
Dept: ORTHOPEDIC SURGERY | Age: 56
End: 2021-12-16

## 2021-12-16 NOTE — TELEPHONE ENCOUNTER
General Question     Subject: PATIENT HAS AN MRI SCHEDULED FOR TODAY, December 16, 2021 AT 12:15 AT [facility]. HE IS EXTREMELY CLAUSTOPHIC AND WOULD LIKE A PRESCRIPTION FOR VALIUM TO TAKE PRIOR TO THE MRI. PLEASE ADVISE PATIENT AS SOON AS POSSIBLE. [address]  [place] -     Patient: Tristan Kumar   Contact Number: [phone]

## 2022-01-25 ENCOUNTER — TELEPHONE (OUTPATIENT)
Dept: FAMILY MEDICINE CLINIC | Age: 57
End: 2022-01-25

## 2022-01-25 DIAGNOSIS — I10 ESSENTIAL HYPERTENSION: ICD-10-CM

## 2022-01-25 RX ORDER — LISINOPRIL 10 MG/1
TABLET ORAL
Qty: 90 TABLET | Refills: 0 | Status: SHIPPED | OUTPATIENT
Start: 2022-01-25 | End: 2022-10-19 | Stop reason: SDUPTHER

## 2022-01-25 NOTE — TELEPHONE ENCOUNTER
Requested Prescriptions     Pending Prescriptions Disp Refills    lisinopril (PRINIVIL;ZESTRIL) 10 MG tablet [Pharmacy Med Name: LISINOPRIL 10 MG TABLET] 90 tablet 0     Sig: TAKE ONE TABLET BY MOUTH DAILY     Last ov 1/29/2021  Last labs 9/1/20

## 2022-04-01 DIAGNOSIS — E78.1 HYPERTRIGLYCERIDEMIA: ICD-10-CM

## 2022-04-01 DIAGNOSIS — I10 ESSENTIAL HYPERTENSION: ICD-10-CM

## 2022-04-01 DIAGNOSIS — E03.9 ACQUIRED HYPOTHYROIDISM: ICD-10-CM

## 2022-04-01 DIAGNOSIS — R74.8 ELEVATED LIVER ENZYMES: ICD-10-CM

## 2022-04-01 LAB
A/G RATIO: 2.4 (ref 1.1–2.2)
ALBUMIN SERPL-MCNC: 4.5 G/DL (ref 3.4–5)
ALP BLD-CCNC: 55 U/L (ref 40–129)
ALT SERPL-CCNC: 43 U/L (ref 10–40)
ANION GAP SERPL CALCULATED.3IONS-SCNC: 12 MMOL/L (ref 3–16)
AST SERPL-CCNC: 32 U/L (ref 15–37)
BILIRUB SERPL-MCNC: <0.2 MG/DL (ref 0–1)
BUN BLDV-MCNC: 17 MG/DL (ref 7–20)
CALCIUM SERPL-MCNC: 9.3 MG/DL (ref 8.3–10.6)
CHLORIDE BLD-SCNC: 104 MMOL/L (ref 99–110)
CHOLESTEROL, TOTAL: 198 MG/DL (ref 0–199)
CO2: 24 MMOL/L (ref 21–32)
CREAT SERPL-MCNC: 0.9 MG/DL (ref 0.9–1.3)
GFR AFRICAN AMERICAN: >60
GFR NON-AFRICAN AMERICAN: >60
GLUCOSE BLD-MCNC: 83 MG/DL (ref 70–99)
HCT VFR BLD CALC: 44.9 % (ref 40.5–52.5)
HDLC SERPL-MCNC: 37 MG/DL (ref 40–60)
HEMOGLOBIN: 15.1 G/DL (ref 13.5–17.5)
LDL CHOLESTEROL CALCULATED: 123 MG/DL
MCH RBC QN AUTO: 29.7 PG (ref 26–34)
MCHC RBC AUTO-ENTMCNC: 33.6 G/DL (ref 31–36)
MCV RBC AUTO: 88.6 FL (ref 80–100)
PDW BLD-RTO: 13.5 % (ref 12.4–15.4)
PLATELET # BLD: 230 K/UL (ref 135–450)
PMV BLD AUTO: 8.6 FL (ref 5–10.5)
POTASSIUM SERPL-SCNC: 4.3 MMOL/L (ref 3.5–5.1)
RBC # BLD: 5.07 M/UL (ref 4.2–5.9)
SODIUM BLD-SCNC: 140 MMOL/L (ref 136–145)
T4 FREE: 1 NG/DL (ref 0.9–1.8)
TOTAL PROTEIN: 6.4 G/DL (ref 6.4–8.2)
TRIGL SERPL-MCNC: 188 MG/DL (ref 0–150)
TSH SERPL DL<=0.05 MIU/L-ACNC: 10.42 UIU/ML (ref 0.27–4.2)
VLDLC SERPL CALC-MCNC: 38 MG/DL
WBC # BLD: 5.5 K/UL (ref 4–11)

## 2022-04-03 DIAGNOSIS — R74.8 ELEVATED LIVER ENZYMES: Primary | ICD-10-CM

## 2022-04-03 DIAGNOSIS — E03.9 ACQUIRED HYPOTHYROIDISM: ICD-10-CM

## 2022-04-03 DIAGNOSIS — E03.9 ACQUIRED HYPOTHYROIDISM: Primary | ICD-10-CM

## 2022-04-03 RX ORDER — LEVOTHYROXINE SODIUM 88 UG/1
88 TABLET ORAL DAILY
Qty: 90 TABLET | Refills: 0 | Status: SHIPPED
Start: 2022-04-03 | End: 2022-04-05 | Stop reason: ALTCHOICE

## 2022-04-05 ENCOUNTER — TELEMEDICINE (OUTPATIENT)
Dept: FAMILY MEDICINE CLINIC | Age: 57
End: 2022-04-05
Payer: COMMERCIAL

## 2022-04-05 DIAGNOSIS — E03.9 ACQUIRED HYPOTHYROIDISM: ICD-10-CM

## 2022-04-05 DIAGNOSIS — F32.A DEPRESSION, UNSPECIFIED DEPRESSION TYPE: ICD-10-CM

## 2022-04-05 DIAGNOSIS — N52.9 ERECTILE DYSFUNCTION, UNSPECIFIED ERECTILE DYSFUNCTION TYPE: Primary | ICD-10-CM

## 2022-04-05 PROCEDURE — 99214 OFFICE O/P EST MOD 30 MIN: CPT | Performed by: FAMILY MEDICINE

## 2022-04-05 PROCEDURE — G8427 DOCREV CUR MEDS BY ELIG CLIN: HCPCS | Performed by: FAMILY MEDICINE

## 2022-04-05 PROCEDURE — 3017F COLORECTAL CA SCREEN DOC REV: CPT | Performed by: FAMILY MEDICINE

## 2022-04-05 RX ORDER — SILDENAFIL 50 MG/1
TABLET, FILM COATED ORAL
Qty: 18 TABLET | Refills: 2 | Status: SHIPPED | OUTPATIENT
Start: 2022-04-05

## 2022-04-05 RX ORDER — LEVOTHYROXINE SODIUM 88 UG/1
88 TABLET ORAL DAILY
Qty: 90 TABLET | Refills: 0 | Status: CANCELLED | OUTPATIENT
Start: 2022-04-05

## 2022-04-05 RX ORDER — BUPROPION HYDROCHLORIDE 150 MG/1
150 TABLET ORAL EVERY MORNING
Qty: 30 TABLET | Refills: 1 | Status: SHIPPED | OUTPATIENT
Start: 2022-04-05 | End: 2022-07-15

## 2022-04-05 RX ORDER — LEVOTHYROXINE SODIUM 0.07 MG/1
TABLET ORAL
Qty: 90 TABLET | Refills: 0 | Status: SHIPPED
Start: 2022-04-05 | End: 2022-06-27 | Stop reason: DRUGHIGH

## 2022-04-05 SDOH — ECONOMIC STABILITY: FOOD INSECURITY: WITHIN THE PAST 12 MONTHS, THE FOOD YOU BOUGHT JUST DIDN'T LAST AND YOU DIDN'T HAVE MONEY TO GET MORE.: NEVER TRUE

## 2022-04-05 SDOH — ECONOMIC STABILITY: FOOD INSECURITY: WITHIN THE PAST 12 MONTHS, YOU WORRIED THAT YOUR FOOD WOULD RUN OUT BEFORE YOU GOT MONEY TO BUY MORE.: NEVER TRUE

## 2022-04-05 ASSESSMENT — PATIENT HEALTH QUESTIONNAIRE - PHQ9
SUM OF ALL RESPONSES TO PHQ QUESTIONS 1-9: 2
2. FEELING DOWN, DEPRESSED OR HOPELESS: 1
1. LITTLE INTEREST OR PLEASURE IN DOING THINGS: 1
SUM OF ALL RESPONSES TO PHQ QUESTIONS 1-9: 2
SUM OF ALL RESPONSES TO PHQ9 QUESTIONS 1 & 2: 2

## 2022-04-05 ASSESSMENT — SOCIAL DETERMINANTS OF HEALTH (SDOH): HOW HARD IS IT FOR YOU TO PAY FOR THE VERY BASICS LIKE FOOD, HOUSING, MEDICAL CARE, AND HEATING?: NOT HARD AT ALL

## 2022-04-05 NOTE — PROGRESS NOTES
2022    TELEHEALTH EVALUATION -- Audio/Visual (During BVNPQ-03 public health emergency)    Due to COVID 19 outbreak, patient's office visit was converted to a virtual visit. Patient was contacted and agreed to proceed with a virtual visit via Doxy. me  The risks and benefits of converting to a virtual visit were discussed in light of the current infectious disease epidemic. Patient also understood that insurance coverage and co-pays are up to their individual insurance plans. HPI:    Berenice Britton (:  1965) has requested an audio/video evaluation for the following concern(s):    Hypothyroidism. He was taking Synthroid 0.075 mg regularly but was taking it with [facility]. He tore left leg muscle in  and stopped Synthroid then. He realizes the TSH is likely not accurate . He has been taking more Acetaminophen. Drinks alcohol q 2 weeks. He has lost weight - 20 lbs and would like to lose 40 more lbs. He is doing it with diet and exercise. Eating 1500 calories /day. He has some problems with depression and focus. Decreased interest in things / hobbies. He was taking Fluoxetine 20 mg qd and did not think it helped so stopped in . Retired 1 year ago and struggling with it. No h/o seizures. He took Wellbutrin for smoking cessation and helped him quit without side effects . His symptoms are not new since since . Patient wants a refill of Viagra 50 mg , which works for him without side effects . Review of Systems    Prior to Visit Medications    Medication Sig Taking?  Authorizing Provider   levothyroxine (SYNTHROID) 88 MCG tablet Take 1 tablet by mouth daily Yes Odalys Ingram MD   lisinopril (PRINIVIL;ZESTRIL) 10 MG tablet TAKE ONE TABLET BY MOUTH DAILY Yes Abeba Irvin MD   cyclobenzaprine (FLEXERIL) 10 MG tablet TAKE ONE-HALF (1/2) TO ONE TABLET EVERY 8 HOURS AS NEEDED FOR MUSCLE SPASMS Yes Abeba Irvin MD   FLUoxetine (PROZAC) 20 MG capsule TAKE 1 CAPSULE DAILY Yes Judy Reyna MD   Elastic Bandages & Supports (ACE TENNIS ELBOW BRACE) MISC 2 each by Does not apply route daily Yes Judy Reyna MD   sildenafil (VIAGRA) 50 MG tablet TAKE 1 TABLET DAILY AS NEEDED FOR ERECTILE DYSFUNCTION Yes Judy Reyna MD   diflorasone (PSORCON) 0.05 % ointment APPLY TOPICALLY DAILY Yes Judy Reyna MD   Multiple Vitamin (THERA) TABS Take  by mouth. Yes Historical Provider, MD   ibuprofen (ADVIL;MOTRIN) 200 MG tablet Take 400 mg by mouth every 6 hours as needed for Pain  Patient not taking: Reported on 8/11/2021  Historical Provider, MD   naproxen (NAPROSYN) 250 MG tablet Take 1 tablet by mouth 2 times daily (with meals) Indications: TAKING 2 EVERY DAY  Patient not taking: Reported on 8/11/2021  Nivia Morris MD       No Known Allergies    Social History     Tobacco Use    Smoking status: Never Smoker    Smokeless tobacco: Current User   Vaping Use    Vaping Use: Every day    Substances: Always   Substance Use Topics    Alcohol use: Yes     Alcohol/week: 6.0 standard drinks     Types: 6 Standard drinks or equivalent per week     Comment: OCCASIONAL    Drug use: No     Types: Marijuana Capri Farah     Comment: ONLY IN COLLEGE        Past Medical History:   Diagnosis Date    External hemorrhoid     Hypertriglyceridemia     Psoriasis     Sprain of thoracic region        History reviewed. No pertinent surgical history.     Health Maintenance   Topic Date Due    Depression Screen  Never done    Flu vaccine (Season Ended) 09/01/2022    TSH testing  04/01/2023    Potassium monitoring  04/01/2023    Creatinine monitoring  04/01/2023    DTaP/Tdap/Td vaccine (2 - Td or Tdap) 04/18/2023    Lipid screen  04/01/2027    Colorectal Cancer Screen  07/20/2028    Shingles Vaccine  Completed    COVID-19 Vaccine  Completed    Hepatitis C screen  Completed    HIV screen  Completed    Hepatitis A vaccine  Aged Out    Hepatitis B vaccine  Aged C/ Oscar Sujatha 19 Hib vaccine  Aged Out    Meningococcal (ACWY) vaccine  Aged Out    Pneumococcal 0-64 years Vaccine  Aged Out       Family History   Problem Relation Age of Onset    Heart Disease Father         a-fib    Alzheimer's Disease Mother        PHYSICAL EXAMINATION:    Vital Signs: (As obtained by patient/caregiver or practitioner observation)     Patient-Reported Vitals 4/5/2022   Patient-Reported Weight 197lb   Patient-Reported Height 5'8   Patient-Reported Systolic 949   Patient-Reported Diastolic 74       Heart rate= 80  Respiratory rate= 14 Temperature= 98      Constitutional:  Appears well-developed and well-nourished. No apparent distress                              Mental status:  Alert and awake. Oriented to person/place/time. Able to follow commands       Eyes: EOM intact. Sclera-normal. No erythema of conjunctiva. No eye discharge. HENT: Normocephalic, atraumatic. Mouth/Throat: normal. Mucous membranes are moist.      External Ears: Normal       Neck: No visualized mass      Pulmonary/Chest:  Respiratory effort normal.  No visualized signs of difficulty breathing or respiratory distress         Musculoskeletal:   Normal gait with no signs of ataxia. Normal range of motion of neck. Neurological:         No Facial Asymmetry (Cranial nerve 7 motor function) (limited exam to video visit) . No gaze palsy              Skin:                     No significant exanthematous lesions or discoloration noted on facial skin                                        Psychiatric:          Normal Affect. No Hallucinations           Other pertinent observable physical exam findings:       ASSESSMENT/PLAN:  1. Acquired hypothyroidism  - Given patient has been off Synthroid for > 8 weeks, will restart at prior dose of 0.075 mg daily and recheck thyroid studies in 6-8 weeks. - levothyroxine (SYNTHROID) 75 MCG tablet; TAKE 1 TABLET DAILY  Dispense: 90 tablet; Refill: 0    2.  Erectile dysfunction, unspecified erectile dysfunction type  - Refill Sildenafil (VIAGRA) 50 MG tablet; TAKE 1 TABLET DAILY AS NEEDED FOR ERECTILE DYSFUNCTION  Dispense:18 tablet; Refill: 2    3. Depression, unspecified depression type  - Start Wellbutrin  mg qd. Medication discussed, including use , risks, side effects and benefits. Patient voiced understanding and agrees with use. Barriers to medication compliance addressed. All the patient's questions were addressed. - buPROPion (WELLBUTRIN XL) 150 MG extended release tablet; Take 1 tablet by mouth every morning  Dispense: 30 tablet; Refill: 1    Follow up 4 -6 weeks/ prn.    --Boom Chiang MD on 4/5/2022 at 10:26 AM    {  Pursuant to the emergency declaration under the [facility], [facility] waiver authority and the [employer] and [employer] Act, this Virtual  Visit was conducted, with patient's consent, to reduce the patient's risk of exposure to COVID-19 and provide continuity of care for an established patient. Services were provided through a video synchronous discussion virtually to substitute for in-person clinic visit.

## 2022-04-12 PROBLEM — I10 ESSENTIAL HYPERTENSION: Status: ACTIVE | Noted: 2022-04-12

## 2022-04-12 PROBLEM — R93.1 ABNORMAL SCREENING CARDIAC CT: Status: ACTIVE | Noted: 2022-04-12

## 2022-04-13 NOTE — PROGRESS NOTES
2022    TELEHEALTH EVALUATION -- Audio/Visual (During [place] [id number] public health emergency)    HPI:    Yamil Quinones (:  1965) has requested an audio/video evaluation for the following concern(s):    The primary encounter diagnosis was Recurrent major depressive disorder, in full remission ([facility] [address] Diagnoses of Elevated ALT measurement, Acquired hypothyroidism, Hypertriglyceridemia, Abnormal screening cardiac CT, and Essential hypertension were also pertinent to this visit. Depression: onset post assisted. Recurrent. Previously on Prozac without improvement. Thought Wellbutrin helped when he took it to quit smoking so Dr. Cisse Rather started him on it on 22. Is helping; energy is better. No appetite  disturbance, no anhedonia, Not suicidal.  Does not sleep well when has to go to [facility] to help his Dad. Elevate ALT: minor elevation. Alcohol: drinks 0 to 6-8 a day. Drinks excessively a few times a year at a football game. Supplements, acetaminophen:  Taking Acetaminophen 2000 mg a day. Takes D, C, fish oil, tumeric, MVI , melatonin    Hypothyroid:  Last TSH elevated; was off meds x 8 weeks. Resumed meds last week. Abnormal CT lungs: no pulmonary nodules but + coronary artery calcifications. Not on statin  Also showed 4 mm LNS in cardiopulmomary window and arthritis of the spine. FH: neg for CAD  Smoking: vaping. Is cutting back. Wellbutrin helps. The patient denies cough, chest pain, dyspnea, wheezing or hemoptysis. Hypertension: Patient here for follow-up of elevated blood pressure. He is exercising and is adherent to low salt diet. Blood pressure is not testing home. Cardiac symptoms none. Patient denies dyspnea, exertional chest pressure/discomfort, lower extremity edema and palpitations. Cardiovascular risk factors: dyslipidemia, hypertension, obesity (BMI >= 30 kg/m2) and smoking/ tobacco exposure. Use of agents associated with hypertension: none.  History of target TABLET BY MOUTH DAILY 90 tablet 0    cyclobenzaprine (FLEXERIL) 10 MG tablet TAKE ONE-HALF (1/2) TO ONE TABLET EVERY 8 HOURS AS NEEDED FOR MUSCLE SPASMS 90 tablet 11    FLUoxetine (PROZAC) 20 MG capsule TAKE 1 CAPSULE DAILY 90 capsule 1    Elastic Bandages & Supports (ACE TENNIS ELBOW BRACE) MISC 2 each by Does not apply route daily 1 each 0    diflorasone (PSORCON) 0.05 % ointment APPLY TOPICALLY DAILY 15 g 2    Multiple Vitamin (THERA) TABS Take  by mouth. Social History     Tobacco Use    Smoking status: Never Smoker    Smokeless tobacco: Current User   Vaping Use    Vaping Use: Every day    Substances: Always   Substance Use Topics    Alcohol use:  Yes     Alcohol/week: 6.0 standard drinks     Types: 6 Standard drinks or equivalent per week     Comment: OCCASIONAL    Drug use: No     Types: Marijuana (Weed)     Comment: ONLY IN COLLEGE        No Known Allergies,   Past Medical History:   Diagnosis Date    External hemorrhoid     Hypertriglyceridemia     Psoriasis     Sprain of thoracic region        PHYSICAL EXAMINATION:  [ INSTRUCTIONS:  \"[x]\" Indicates a positive item  \"[]\" Indicates a negative item  -- DELETE ALL ITEMS NOT EXAMINED]  Vital Signs: (As obtained by patient/caregiver or practitioner observation)    Blood pressure-  120/74Heart rate- 88   Respiratory rate-    Temperature-  Pulse oximetry-   Wt 196 lb  Wt Readings from Last 3 Encounters:   12/15/21 215 lb (97.5 kg)   08/11/21 209 lb 3.2 oz (94.9 kg)   01/29/21 220 lb (99.8 kg)     Temp Readings from Last 3 Encounters:   08/11/21 97 °F (36.1 °C)   01/29/21 96.7 °F (35.9 °C) (Temporal)   02/18/20 97.2 °F (36.2 °C) (Oral)     BP Readings from Last 3 Encounters:   08/11/21 118/82   01/29/21 120/69   02/28/20 138/84     Pulse Readings from Last 3 Encounters:   08/11/21 80   01/29/21 74   02/18/20 79      Constitutional: [x] Appears well-developed and well-nourished [x] No apparent distress      [] Abnormal-   Mental status  [x] Alert and awake  [x] Oriented to person/place/time [x]Able to follow commands      Eyes:  EOM    [x]  Normal  [] Abnormal-  Sclera  [x]  Normal  [] Abnormal -         Discharge [x]  None visible  [] Abnormal -    HENT:   [x] Normocephalic, atraumatic. [x] Abnormal   [] Mouth/Throat: Mucous membranes are moist.     External Ears [x] Normal  [] Abnormal-     Neck: [x] No visualized mass     Pulmonary/Chest: [x] Respiratory effort normal.  [x] No visualized signs of difficulty breathing or respiratory distress        [] Abnormal-      Musculoskeletal:   [] Normal gait with no signs of ataxia         [] Normal range of motion of neck        [] Abnormal-       Neurological:        [x] No Facial Asymmetry (Cranial nerve 7 motor function) (limited exam to video visit)           Skin:        [x] No significant exanthematous lesions or discoloration noted on facial skin         [] Abnormal-            Psychiatric:       [x] Normal Affect [] No Hallucinations        [] Abnormal-   Mood +, affect is normal, speech appropriate, no agitation or psychomotor retardation. Not suicidal.      Other pertinent observable physical exam findings-     ASSESSMENT/PLAN:  1. Recurrent major depressive disorder, in full remission ([facility] [address]  Doing well on Wellbutrin. Try meditation for sleep     2. Elevated ALT measurement  Discussed diet, exercise and weight loss strategies   Repeat 3 months    3. Acquired hypothyroidism  Compliance addressed. TSH 6 weeks. 4. Hypertriglyceridemia  Discussed diet, exercise and weight loss strategies      5. Abnormal screening cardiac CT  Start statin. Discussed diet, exercise and weight loss strategies     6. Essential hypertension  Diastolic a bit above goal < 130/80  Continue weight loss, diet, lisinopril and adjust at follow up if needed    7. Pure hypercholesterolemia  Lipitor 40 mg    Side effects of current medications reviewed and questions answered. Follow up in 6 months or prn.        No follow-ups on file. Burleson Alpha, was evaluated through a synchronous (real-time) audio-video encounter. The patient (or guardian if applicable) is aware that this is a billable service, which includes applicable co-pays. This Virtual Visit was conducted with patient's (and/or legal guardian's) consent. The visit was conducted pursuant to the emergency declaration under the [address] [place] [address] and the [employer] and [employer] General Act. Patient identification was verified, and a caregiver was present when appropriate. The patient was located at home in a state where the provider was licensed to provide care. Total time spent on this encounter: Not billed by time    --Art Bell MD on 4/12/2022 at 8:54 PM    An electronic signature was used to authenticate this note.

## 2022-04-14 ENCOUNTER — TELEMEDICINE (OUTPATIENT)
Dept: FAMILY MEDICINE CLINIC | Age: 57
End: 2022-04-14
Payer: COMMERCIAL

## 2022-04-14 DIAGNOSIS — E78.1 HYPERTRIGLYCERIDEMIA: ICD-10-CM

## 2022-04-14 DIAGNOSIS — R93.1 ABNORMAL SCREENING CARDIAC CT: ICD-10-CM

## 2022-04-14 DIAGNOSIS — R74.01 ELEVATED ALT MEASUREMENT: ICD-10-CM

## 2022-04-14 DIAGNOSIS — I10 ESSENTIAL HYPERTENSION: ICD-10-CM

## 2022-04-14 DIAGNOSIS — E03.9 ACQUIRED HYPOTHYROIDISM: ICD-10-CM

## 2022-04-14 DIAGNOSIS — F33.42 RECURRENT MAJOR DEPRESSIVE DISORDER, IN FULL REMISSION (HCC): Primary | ICD-10-CM

## 2022-04-14 DIAGNOSIS — E78.00 PURE HYPERCHOLESTEROLEMIA: ICD-10-CM

## 2022-04-14 PROCEDURE — G8427 DOCREV CUR MEDS BY ELIG CLIN: HCPCS | Performed by: FAMILY MEDICINE

## 2022-04-14 PROCEDURE — 3017F COLORECTAL CA SCREEN DOC REV: CPT | Performed by: FAMILY MEDICINE

## 2022-04-14 PROCEDURE — 99214 OFFICE O/P EST MOD 30 MIN: CPT | Performed by: FAMILY MEDICINE

## 2022-04-14 RX ORDER — CHOLECALCIFEROL (VITAMIN D3) 125 MCG
1 CAPSULE ORAL DAILY
Qty: 30 TABLET | COMMUNITY
Start: 2022-04-14

## 2022-04-14 RX ORDER — ATORVASTATIN CALCIUM 40 MG/1
40 TABLET, FILM COATED ORAL DAILY
Qty: 90 TABLET | Refills: 1 | Status: SHIPPED | OUTPATIENT
Start: 2022-04-14 | End: 2022-10-10

## 2022-04-19 ENCOUNTER — TELEPHONE (OUTPATIENT)
Dept: FAMILY MEDICINE CLINIC | Age: 57
End: 2022-04-19

## 2022-04-19 DIAGNOSIS — R93.1 ABNORMAL SCREENING CARDIAC CT: Primary | ICD-10-CM

## 2022-04-19 DIAGNOSIS — E78.00 PURE HYPERCHOLESTEROLEMIA: ICD-10-CM

## 2022-06-27 DIAGNOSIS — R74.8 ELEVATED LIVER ENZYMES: ICD-10-CM

## 2022-06-27 DIAGNOSIS — E03.9 ACQUIRED HYPOTHYROIDISM: Primary | ICD-10-CM

## 2022-06-27 DIAGNOSIS — E03.9 ACQUIRED HYPOTHYROIDISM: ICD-10-CM

## 2022-06-27 LAB
A/G RATIO: 2.2 (ref 1.1–2.2)
ALBUMIN SERPL-MCNC: 4.7 G/DL (ref 3.4–5)
ALP BLD-CCNC: 82 U/L (ref 40–129)
ALT SERPL-CCNC: 45 U/L (ref 10–40)
ANION GAP SERPL CALCULATED.3IONS-SCNC: 13 MMOL/L (ref 3–16)
AST SERPL-CCNC: 30 U/L (ref 15–37)
BILIRUB SERPL-MCNC: <0.2 MG/DL (ref 0–1)
BUN BLDV-MCNC: 17 MG/DL (ref 7–20)
CALCIUM SERPL-MCNC: 9.7 MG/DL (ref 8.3–10.6)
CHLORIDE BLD-SCNC: 98 MMOL/L (ref 99–110)
CO2: 26 MMOL/L (ref 21–32)
CREAT SERPL-MCNC: 1 MG/DL (ref 0.9–1.3)
GFR AFRICAN AMERICAN: >60
GFR NON-AFRICAN AMERICAN: >60
GLUCOSE BLD-MCNC: 76 MG/DL (ref 70–99)
POTASSIUM SERPL-SCNC: 4.5 MMOL/L (ref 3.5–5.1)
SODIUM BLD-SCNC: 137 MMOL/L (ref 136–145)
T4 FREE: 1.4 NG/DL (ref 0.9–1.8)
TOTAL PROTEIN: 6.8 G/DL (ref 6.4–8.2)
TSH SERPL DL<=0.05 MIU/L-ACNC: 6.63 UIU/ML (ref 0.27–4.2)

## 2022-06-27 RX ORDER — LEVOTHYROXINE SODIUM 0.1 MG/1
100 TABLET ORAL DAILY
Qty: 90 TABLET | Refills: 0 | Status: SHIPPED
Start: 2022-06-27 | End: 2022-09-09 | Stop reason: DRUGHIGH

## 2022-07-15 DIAGNOSIS — F32.A DEPRESSION, UNSPECIFIED DEPRESSION TYPE: ICD-10-CM

## 2022-07-15 RX ORDER — BUPROPION HYDROCHLORIDE 150 MG/1
TABLET ORAL
Qty: 90 TABLET | Refills: 1 | Status: SHIPPED | OUTPATIENT
Start: 2022-07-15

## 2022-09-09 DIAGNOSIS — E03.9 ACQUIRED HYPOTHYROIDISM: Primary | ICD-10-CM

## 2022-09-09 DIAGNOSIS — E03.9 ACQUIRED HYPOTHYROIDISM: ICD-10-CM

## 2022-09-09 LAB
T4 FREE: 1.3 NG/DL (ref 0.9–1.8)
TSH SERPL DL<=0.05 MIU/L-ACNC: 4.61 UIU/ML (ref 0.27–4.2)

## 2022-09-09 RX ORDER — LEVOTHYROXINE SODIUM 112 UG/1
112 TABLET ORAL DAILY
Qty: 90 TABLET | Refills: 0 | Status: SHIPPED | OUTPATIENT
Start: 2022-11-04 | End: 2022-10-19 | Stop reason: SDUPTHER

## 2022-09-15 ENCOUNTER — TELEPHONE (OUTPATIENT)
Dept: FAMILY MEDICINE CLINIC | Age: 57
End: 2022-09-15

## 2022-10-09 DIAGNOSIS — E78.00 PURE HYPERCHOLESTEROLEMIA: ICD-10-CM

## 2022-10-10 RX ORDER — ATORVASTATIN CALCIUM 40 MG/1
40 TABLET, FILM COATED ORAL DAILY
Qty: 90 TABLET | Refills: 0 | Status: SHIPPED | OUTPATIENT
Start: 2022-10-10 | End: 2022-10-19 | Stop reason: SDUPTHER

## 2022-10-10 NOTE — TELEPHONE ENCOUNTER
Requested Prescriptions     Pending Prescriptions Disp Refills    atorvastatin (LIPITOR) 40 MG tablet [Pharmacy Med Name: ATORVASTATIN 40 MG TABLET] 90 tablet 1     Sig: TAKE ONE TABLET BY MOUTH DAILY     Last OV; 4/14/2022  Last labs; 6/27/2022  No f/u     Sent an message through Evernote to remind pt is due for an office visit.

## 2022-10-13 ENCOUNTER — TELEPHONE (OUTPATIENT)
Dept: FAMILY MEDICINE CLINIC | Age: 57
End: 2022-10-13

## 2022-10-13 DIAGNOSIS — I10 ESSENTIAL HYPERTENSION: ICD-10-CM

## 2022-10-13 DIAGNOSIS — E03.9 ACQUIRED HYPOTHYROIDISM: ICD-10-CM

## 2022-10-13 DIAGNOSIS — E78.1 HYPERTRIGLYCERIDEMIA: Primary | ICD-10-CM

## 2022-10-13 DIAGNOSIS — R74.8 ELEVATED LIVER ENZYMES: ICD-10-CM

## 2022-10-13 NOTE — TELEPHONE ENCOUNTER
Patient has an appointment on 10/19/2022. He was wondering if his blood work can be put in the system ahead of time. Please call patient once added.     Thank you

## 2022-10-13 NOTE — TELEPHONE ENCOUNTER
Labs are pending, pt had CMP done in June of this year and the TSH and T4 have already been ordered.     Thank you

## 2022-10-17 DIAGNOSIS — E78.1 HYPERTRIGLYCERIDEMIA: ICD-10-CM

## 2022-10-17 DIAGNOSIS — I10 ESSENTIAL HYPERTENSION: ICD-10-CM

## 2022-10-17 DIAGNOSIS — E03.9 ACQUIRED HYPOTHYROIDISM: ICD-10-CM

## 2022-10-17 DIAGNOSIS — R74.8 ELEVATED LIVER ENZYMES: ICD-10-CM

## 2022-10-17 LAB
A/G RATIO: 2.1 (ref 1.1–2.2)
ALBUMIN SERPL-MCNC: 4.1 G/DL (ref 3.4–5)
ALP BLD-CCNC: 62 U/L (ref 40–129)
ALT SERPL-CCNC: 40 U/L (ref 10–40)
ANION GAP SERPL CALCULATED.3IONS-SCNC: 10 MMOL/L (ref 3–16)
AST SERPL-CCNC: 23 U/L (ref 15–37)
BILIRUB SERPL-MCNC: 0.3 MG/DL (ref 0–1)
BUN BLDV-MCNC: 17 MG/DL (ref 7–20)
CALCIUM SERPL-MCNC: 9.4 MG/DL (ref 8.3–10.6)
CHLORIDE BLD-SCNC: 102 MMOL/L (ref 99–110)
CHOLESTEROL, TOTAL: 106 MG/DL (ref 0–199)
CO2: 26 MMOL/L (ref 21–32)
CREAT SERPL-MCNC: 0.8 MG/DL (ref 0.9–1.3)
GFR SERPL CREATININE-BSD FRML MDRD: >60 ML/MIN/{1.73_M2}
GLUCOSE BLD-MCNC: 93 MG/DL (ref 70–99)
HDLC SERPL-MCNC: 49 MG/DL (ref 40–60)
LDL CHOLESTEROL CALCULATED: 45 MG/DL
POTASSIUM SERPL-SCNC: 4.3 MMOL/L (ref 3.5–5.1)
SODIUM BLD-SCNC: 138 MMOL/L (ref 136–145)
T4 FREE: 1.2 NG/DL (ref 0.9–1.8)
TOTAL PROTEIN: 6.1 G/DL (ref 6.4–8.2)
TRIGL SERPL-MCNC: 60 MG/DL (ref 0–150)
TSH SERPL DL<=0.05 MIU/L-ACNC: 2.24 UIU/ML (ref 0.27–4.2)
VLDLC SERPL CALC-MCNC: 12 MG/DL

## 2022-10-19 ENCOUNTER — OFFICE VISIT (OUTPATIENT)
Dept: FAMILY MEDICINE CLINIC | Age: 57
End: 2022-10-19
Payer: COMMERCIAL

## 2022-10-19 VITALS
WEIGHT: 194 LBS | DIASTOLIC BLOOD PRESSURE: 82 MMHG | HEART RATE: 61 BPM | HEIGHT: 68 IN | BODY MASS INDEX: 29.4 KG/M2 | OXYGEN SATURATION: 98 % | SYSTOLIC BLOOD PRESSURE: 120 MMHG

## 2022-10-19 DIAGNOSIS — M51.36 DDD (DEGENERATIVE DISC DISEASE), LUMBAR: ICD-10-CM

## 2022-10-19 DIAGNOSIS — I10 ESSENTIAL HYPERTENSION: ICD-10-CM

## 2022-10-19 DIAGNOSIS — R39.15 URINARY URGENCY: ICD-10-CM

## 2022-10-19 DIAGNOSIS — F32.A DEPRESSION, UNSPECIFIED DEPRESSION TYPE: Primary | ICD-10-CM

## 2022-10-19 DIAGNOSIS — E03.9 ACQUIRED HYPOTHYROIDISM: ICD-10-CM

## 2022-10-19 DIAGNOSIS — E78.00 PURE HYPERCHOLESTEROLEMIA: ICD-10-CM

## 2022-10-19 DIAGNOSIS — M50.90 CERVICAL DISC DISEASE: ICD-10-CM

## 2022-10-19 DIAGNOSIS — R79.89 LFT ELEVATION: ICD-10-CM

## 2022-10-19 DIAGNOSIS — Z72.89 CURRENT VAPING ON SOME DAYS: ICD-10-CM

## 2022-10-19 PROBLEM — M51.369 DDD (DEGENERATIVE DISC DISEASE), LUMBAR: Status: ACTIVE | Noted: 2022-10-19

## 2022-10-19 PROCEDURE — G8484 FLU IMMUNIZE NO ADMIN: HCPCS | Performed by: FAMILY MEDICINE

## 2022-10-19 PROCEDURE — 3017F COLORECTAL CA SCREEN DOC REV: CPT | Performed by: FAMILY MEDICINE

## 2022-10-19 PROCEDURE — G8417 CALC BMI ABV UP PARAM F/U: HCPCS | Performed by: FAMILY MEDICINE

## 2022-10-19 PROCEDURE — G8427 DOCREV CUR MEDS BY ELIG CLIN: HCPCS | Performed by: FAMILY MEDICINE

## 2022-10-19 PROCEDURE — 90471 IMMUNIZATION ADMIN: CPT | Performed by: FAMILY MEDICINE

## 2022-10-19 PROCEDURE — 99214 OFFICE O/P EST MOD 30 MIN: CPT | Performed by: FAMILY MEDICINE

## 2022-10-19 PROCEDURE — 90750 HZV VACC RECOMBINANT IM: CPT | Performed by: FAMILY MEDICINE

## 2022-10-19 PROCEDURE — 4004F PT TOBACCO SCREEN RCVD TLK: CPT | Performed by: FAMILY MEDICINE

## 2022-10-19 RX ORDER — LEVOTHYROXINE SODIUM 112 UG/1
112 TABLET ORAL DAILY
Qty: 90 TABLET | Refills: 0 | Status: SHIPPED | OUTPATIENT
Start: 2022-11-04

## 2022-10-19 RX ORDER — CYCLOBENZAPRINE HCL 10 MG
TABLET ORAL
Qty: 90 TABLET | Refills: 1 | Status: SHIPPED | OUTPATIENT
Start: 2022-10-19

## 2022-10-19 RX ORDER — SILDENAFIL 100 MG/1
100 TABLET, FILM COATED ORAL PRN
Qty: 18 TABLET | Refills: 2 | Status: SHIPPED | OUTPATIENT
Start: 2022-10-19

## 2022-10-19 RX ORDER — LISINOPRIL 10 MG/1
TABLET ORAL
Qty: 90 TABLET | Refills: 1 | Status: SHIPPED | OUTPATIENT
Start: 2022-10-19

## 2022-10-19 RX ORDER — ATORVASTATIN CALCIUM 40 MG/1
40 TABLET, FILM COATED ORAL DAILY
Qty: 90 TABLET | Refills: 1 | Status: SHIPPED | OUTPATIENT
Start: 2022-10-19

## 2022-10-19 RX ORDER — BUPROPION HYDROCHLORIDE 300 MG/1
300 TABLET ORAL EVERY MORNING
Qty: 90 TABLET | Refills: 1 | Status: SHIPPED | OUTPATIENT
Start: 2022-10-19

## 2022-10-19 NOTE — PROGRESS NOTES
Patient is here for follow up of hypertension and hypercholesterolemia. His best friend  of lung cancer with h/o occupation - . He is executor of his will. Some urinary urgency . Caffeine - coffee - 2- coffee. 4-5 Diet [facility] / day ;  0-1 / month of alcohol. Denies fever, chest pain , shortness of breath or cough. Review of Systems    ROS: All other systems were reviewed and are negative . Patient's allergies and medications were reviewed. Patient's past medical, surgical, social , and family history were reviewed. No results found for this visit on 10/19/22. OBJECTIVE:  /82 (Site: Left Upper Arm, Position: Sitting, Cuff Size: Medium Adult)   Pulse 61   Ht 5' 8\" (1.727 m)   Wt 194 lb (88 kg)   SpO2 98%   BMI 29.50 kg/m²     Physical Exam    General: NAD, cooperative, alert and oriented X 3. Mood / affect is good. good insight. well hydrated. Neck : no lymphadenopathy, supple, FROM  CV: Regular rate and rhythm , no murmurs/ rub/ gallop. No edema. Lungs : CTA bilaterally, breathing comfortably  Abdomen: positive bowel sounds, soft , non tender, non distended. No hepatosplenomegaly. No CVA tenderness. Skin: no rashes. Non tender. ASSESSMENT/  PLAN:  1. Acquired hypothyroidism  - Stable. Continue Synthroid 0.112 mg qd. - levothyroxine (SYNTHROID) 112 MCG tablet; Take 1 tablet by mouth daily  Dispense: 90 tablet; Refill: 0    2. Pure hypercholesterolemia  - Controlled. Continue Lipitor 40 mg qd and low cholesterol diet. - atorvastatin (LIPITOR) 40 MG tablet; Take 1 tablet by mouth daily  Dispense: 90 tablet; Refill: 1    3. Essential hypertension  - Controlled. Continue Lisinopril 10 mg qd and low sodium diet. - lisinopril (PRINIVIL;ZESTRIL) 10 MG tablet; TAKE ONE TABLET BY MOUTH DAILY  Dispense: 90 tablet; Refill: 1    4. Cervical disc disease  - Moist heat . ROM exercises. Modify activities.    - cyclobenzaprine (FLEXERIL) 10 MG tablet; TAKE ONE TABLET EVERY 8 HOURS AS NEEDED FOR MUSCLE SPASMS  Dispense: 90 tablet; Refill: 1    5. Depression, unspecified depression type  - Continue  Wellbutrin XL but at increased dose of 300 mg qd. - buPROPion (WELLBUTRIN XL) 300 MG extended release tablet; Take 1 tablet by mouth every morning  Dispense: 90 tablet; Refill: 1    6. Current vaping on some days  - Increase Wellbutrin  mg qd as previously helped to quit smoking.  - buPROPion (WELLBUTRIN XL) 300 MG extended release tablet; Take 1 tablet by mouth every morning  Dispense: 90 tablet; Refill: 1    7. DDD (degenerative disc disease), lumbar  - Moist heat . ROM exercises. Modify activities. 8. Urinary urgency  - Avoid caffeine/ alcohol. Monitor. 9. LFT elevation  - Continue to avoid Tylenol, NSAIDs (Ibuprofen, Advil, Motrin, or Aleve) , and alcohol. Follow up 6 months/ prn.

## 2022-10-24 DIAGNOSIS — I10 ESSENTIAL HYPERTENSION: ICD-10-CM

## 2022-10-24 RX ORDER — LISINOPRIL 10 MG/1
TABLET ORAL
Qty: 90 TABLET | Refills: 1 | OUTPATIENT
Start: 2022-10-24

## 2022-10-24 NOTE — TELEPHONE ENCOUNTER
Requested Prescriptions     Pending Prescriptions Disp Refills    lisinopril (PRINIVIL;ZESTRIL) 10 MG tablet [Pharmacy Med Name: LISINOPRIL 10 MG TABLET] 90 tablet 1     Sig: TAKE ONE TABLET BY MOUTH DAILY       Last OV; 4/14/2022   Last labs; 10/17/2022  No F/u

## 2022-12-10 DIAGNOSIS — E03.9 ACQUIRED HYPOTHYROIDISM: ICD-10-CM

## 2022-12-12 RX ORDER — LEVOTHYROXINE SODIUM 112 UG/1
TABLET ORAL
Qty: 90 TABLET | Refills: 1 | Status: SHIPPED | OUTPATIENT
Start: 2022-12-12

## 2022-12-12 NOTE — TELEPHONE ENCOUNTER
Requested Prescriptions     Pending Prescriptions Disp Refills    levothyroxine (SYNTHROID) 112 MCG tablet [Pharmacy Med Name: LEVOTHYROXINE 112 MCG TABLET] 90 tablet 1     Sig: TAKE ONE TABLET BY MOUTH DAILY        Last ov 10/19/22   Last lab 10/17/22  Next ov n/a

## 2022-12-19 DIAGNOSIS — E03.9 ACQUIRED HYPOTHYROIDISM: ICD-10-CM

## 2022-12-20 RX ORDER — LEVOTHYROXINE SODIUM 112 UG/1
TABLET ORAL
Qty: 90 TABLET | Refills: 1 | OUTPATIENT
Start: 2022-12-20

## 2022-12-20 NOTE — TELEPHONE ENCOUNTER
Requested Prescriptions     Pending Prescriptions Disp Refills    levothyroxine (SYNTHROID) 112 MCG tablet [Pharmacy Med Name: LEVOTHYROXINE 112 MCG TABLET] 90 tablet 1     Sig: TAKE ONE TABLET BY MOUTH DAILY     Rx sent in on 12/12/22 90 tabs 1 refill

## 2022-12-29 DIAGNOSIS — N52.9 ERECTILE DYSFUNCTION, UNSPECIFIED ERECTILE DYSFUNCTION TYPE: ICD-10-CM

## 2022-12-30 RX ORDER — SILDENAFIL 50 MG/1
TABLET, FILM COATED ORAL
Qty: 18 TABLET | Refills: 2 | Status: SHIPPED | OUTPATIENT
Start: 2022-12-30

## 2022-12-30 NOTE — TELEPHONE ENCOUNTER
No future appointments.   LOV 10/19/2022      Requested Prescriptions     Pending Prescriptions Disp Refills    sildenafil (VIAGRA) 50 MG tablet [Pharmacy Med Name: SILDENAFIL 50 MG TABLET] 18 tablet 2     Sig: TAKE ONE TABLET BY MOUTH DAILY AS NEEDED FOR ERECTILE DYSFUNCTION

## 2023-03-20 DIAGNOSIS — I10 ESSENTIAL HYPERTENSION: ICD-10-CM

## 2023-03-20 RX ORDER — LISINOPRIL 10 MG/1
TABLET ORAL
Qty: 90 TABLET | Refills: 1 | Status: SHIPPED | OUTPATIENT
Start: 2023-03-20

## 2023-03-20 NOTE — TELEPHONE ENCOUNTER
Requested Prescriptions     Pending Prescriptions Disp Refills    lisinopril (PRINIVIL;ZESTRIL) 10 MG tablet [Pharmacy Med Name: LISINOPRIL 10 MG TABLET] 90 tablet 1     Sig: TAKE ONE TABLET BY MOUTH DAILY          Last Office Visit: 10/19/2022     Next Office Visit: Visit date not found     Last Labs: 10/17/22

## 2023-03-26 DIAGNOSIS — E03.9 ACQUIRED HYPOTHYROIDISM: ICD-10-CM

## 2023-03-27 RX ORDER — LEVOTHYROXINE SODIUM 112 UG/1
112 TABLET ORAL DAILY
Qty: 90 TABLET | Refills: 0 | Status: SHIPPED | OUTPATIENT
Start: 2023-03-27

## 2023-03-27 NOTE — TELEPHONE ENCOUNTER
Requested Prescriptions     Pending Prescriptions Disp Refills    levothyroxine (SYNTHROID) 112 MCG tablet [Pharmacy Med Name: LEVOTHYROXINE 112 MCG TABLET] 90 tablet 1     Sig: TAKE ONE TABLET BY MOUTH DAILY     Last OV-10/19/2022  Labs-10/17/22   NFOV

## 2023-06-16 DIAGNOSIS — E78.00 PURE HYPERCHOLESTEROLEMIA: ICD-10-CM

## 2023-06-16 RX ORDER — ATORVASTATIN CALCIUM 40 MG/1
TABLET, FILM COATED ORAL
Qty: 90 TABLET | Refills: 0 | Status: SHIPPED | OUTPATIENT
Start: 2023-06-16

## 2023-06-16 RX ORDER — SILDENAFIL 100 MG/1
TABLET, FILM COATED ORAL
Qty: 18 TABLET | Refills: 2 | Status: SHIPPED | OUTPATIENT
Start: 2023-06-16

## 2023-06-19 ENCOUNTER — TELEPHONE (OUTPATIENT)
Age: 58
End: 2023-06-19

## 2023-06-22 DIAGNOSIS — E03.9 ACQUIRED HYPOTHYROIDISM: ICD-10-CM

## 2023-06-22 LAB
T4 FREE SERPL-MCNC: 1.2 NG/DL (ref 0.9–1.8)
TSH SERPL DL<=0.005 MIU/L-ACNC: 4.63 UIU/ML (ref 0.27–4.2)

## 2023-06-22 RX ORDER — LEVOTHYROXINE SODIUM 0.12 MG/1
125 TABLET ORAL DAILY
Qty: 90 TABLET | Refills: 0 | Status: SHIPPED | OUTPATIENT
Start: 2023-06-22

## 2023-08-01 DIAGNOSIS — M50.90 CERVICAL DISC DISEASE: ICD-10-CM

## 2023-08-02 RX ORDER — CYCLOBENZAPRINE HCL 10 MG
TABLET ORAL
Qty: 90 TABLET | Refills: 1 | Status: SHIPPED | OUTPATIENT
Start: 2023-08-02

## 2023-08-02 NOTE — TELEPHONE ENCOUNTER
Requested Prescriptions     Pending Prescriptions Disp Refills    cyclobenzaprine (FLEXERIL) 10 MG tablet [Pharmacy Med Name: CYCLOBENZAPRINE 10 MG TABLET] 90 tablet 1     Sig: TAKE ONE TABLET BY MOUTH EVERY 8 HOURS AS NEEDED FOR MUSCLE SPASMS         Last OV: 10/19/2022    Last labs: 6/22/23     F/u: no

## 2023-09-14 DIAGNOSIS — R74.8 ELEVATED LIVER ENZYMES: Primary | ICD-10-CM

## 2023-09-14 DIAGNOSIS — Z00.00 ROUTINE GENERAL MEDICAL EXAMINATION AT A HEALTH CARE FACILITY: ICD-10-CM

## 2023-09-14 DIAGNOSIS — R93.1 ABNORMAL SCREENING CARDIAC CT: ICD-10-CM

## 2023-09-14 DIAGNOSIS — E03.9 ACQUIRED HYPOTHYROIDISM: ICD-10-CM

## 2023-09-14 DIAGNOSIS — E78.00 PURE HYPERCHOLESTEROLEMIA: ICD-10-CM

## 2023-09-14 DIAGNOSIS — Z12.5 PROSTATE CANCER SCREENING: ICD-10-CM

## 2023-09-14 DIAGNOSIS — I10 ESSENTIAL HYPERTENSION: ICD-10-CM

## 2023-09-14 RX ORDER — ATORVASTATIN CALCIUM 40 MG/1
40 TABLET, FILM COATED ORAL DAILY
Qty: 90 TABLET | Refills: 0 | Status: SHIPPED | OUTPATIENT
Start: 2023-09-14

## 2023-09-14 NOTE — TELEPHONE ENCOUNTER
Requested Prescriptions     Pending Prescriptions Disp Refills    atorvastatin (LIPITOR) 40 MG tablet [Pharmacy Med Name: ATORVASTATIN 40 MG TABLET] 90 tablet 0     Sig: TAKE 1 TABLET BY MOUTH DAILY        Last OV: 10/19/22    Last labs: 10/17/22     F/u: None

## 2023-09-20 DIAGNOSIS — E03.9 ACQUIRED HYPOTHYROIDISM: ICD-10-CM

## 2023-09-20 RX ORDER — LEVOTHYROXINE SODIUM 0.12 MG/1
125 TABLET ORAL DAILY
Qty: 90 TABLET | Refills: 0 | Status: SHIPPED | OUTPATIENT
Start: 2023-09-20

## 2023-09-20 NOTE — TELEPHONE ENCOUNTER
Requested Prescriptions     Pending Prescriptions Disp Refills    levothyroxine (SYNTHROID) 125 MCG tablet [Pharmacy Med Name: LEVOTHYROXINE 125 MCG TABLET] 90 tablet 0     Sig: TAKE 1 TABLET BY MOUTH DAILY         Last OV: 4/14/2022    Last labs: 6/22/2023     F/u: 9/21/2023

## 2023-09-21 DIAGNOSIS — E78.00 PURE HYPERCHOLESTEROLEMIA: ICD-10-CM

## 2023-09-21 DIAGNOSIS — E03.9 ACQUIRED HYPOTHYROIDISM: ICD-10-CM

## 2023-09-21 DIAGNOSIS — R93.1 ABNORMAL SCREENING CARDIAC CT: ICD-10-CM

## 2023-09-21 DIAGNOSIS — R74.8 ELEVATED LIVER ENZYMES: ICD-10-CM

## 2023-09-21 DIAGNOSIS — Z12.5 PROSTATE CANCER SCREENING: ICD-10-CM

## 2023-09-21 DIAGNOSIS — Z00.00 ROUTINE GENERAL MEDICAL EXAMINATION AT A HEALTH CARE FACILITY: ICD-10-CM

## 2023-09-21 DIAGNOSIS — I10 ESSENTIAL HYPERTENSION: ICD-10-CM

## 2023-09-21 LAB
ALBUMIN SERPL-MCNC: 4.8 G/DL (ref 3.4–5)
ALBUMIN/GLOB SERPL: 1.9 {RATIO} (ref 1.1–2.2)
ALP SERPL-CCNC: 141 U/L (ref 40–129)
ALT SERPL-CCNC: 70 U/L (ref 10–40)
ANION GAP SERPL CALCULATED.3IONS-SCNC: 13 MMOL/L (ref 3–16)
AST SERPL-CCNC: 45 U/L (ref 15–37)
BILIRUB SERPL-MCNC: 0.4 MG/DL (ref 0–1)
BUN SERPL-MCNC: 24 MG/DL (ref 7–20)
CALCIUM SERPL-MCNC: 9.7 MG/DL (ref 8.3–10.6)
CHLORIDE SERPL-SCNC: 100 MMOL/L (ref 99–110)
CHOLEST SERPL-MCNC: 102 MG/DL (ref 0–199)
CO2 SERPL-SCNC: 23 MMOL/L (ref 21–32)
CREAT SERPL-MCNC: 1 MG/DL (ref 0.9–1.3)
DEPRECATED RDW RBC AUTO: 13.8 % (ref 12.4–15.4)
GFR SERPLBLD CREATININE-BSD FMLA CKD-EPI: >60 ML/MIN/{1.73_M2}
GLUCOSE SERPL-MCNC: 88 MG/DL (ref 70–99)
HCT VFR BLD AUTO: 49.1 % (ref 40.5–52.5)
HDLC SERPL-MCNC: 40 MG/DL (ref 40–60)
HGB BLD-MCNC: 16.4 G/DL (ref 13.5–17.5)
LDLC SERPL CALC-MCNC: 47 MG/DL
MCH RBC QN AUTO: 29.5 PG (ref 26–34)
MCHC RBC AUTO-ENTMCNC: 33.5 G/DL (ref 31–36)
MCV RBC AUTO: 88.2 FL (ref 80–100)
PLATELET # BLD AUTO: 368 K/UL (ref 135–450)
PMV BLD AUTO: 8.6 FL (ref 5–10.5)
POTASSIUM SERPL-SCNC: 5.1 MMOL/L (ref 3.5–5.1)
PROT SERPL-MCNC: 7.3 G/DL (ref 6.4–8.2)
PSA SERPL DL<=0.01 NG/ML-MCNC: 0.37 NG/ML (ref 0–4)
RBC # BLD AUTO: 5.56 M/UL (ref 4.2–5.9)
SODIUM SERPL-SCNC: 136 MMOL/L (ref 136–145)
T4 FREE SERPL-MCNC: 1.6 NG/DL (ref 0.9–1.8)
TRIGL SERPL-MCNC: 73 MG/DL (ref 0–150)
TSH SERPL DL<=0.005 MIU/L-ACNC: 4.84 UIU/ML (ref 0.27–4.2)
VLDLC SERPL CALC-MCNC: 15 MG/DL
WBC # BLD AUTO: 10.2 K/UL (ref 4–11)

## 2023-09-26 ENCOUNTER — OFFICE VISIT (OUTPATIENT)
Age: 58
End: 2023-09-26

## 2023-09-26 ENCOUNTER — HOSPITAL ENCOUNTER (OUTPATIENT)
Dept: GENERAL RADIOLOGY | Age: 58
Discharge: HOME | End: 2023-09-26
Payer: COMMERCIAL

## 2023-09-26 VITALS
RESPIRATION RATE: 14 BRPM | DIASTOLIC BLOOD PRESSURE: 84 MMHG | OXYGEN SATURATION: 100 % | HEART RATE: 63 BPM | HEIGHT: 68 IN | BODY MASS INDEX: 27.63 KG/M2 | WEIGHT: 182.3 LBS | SYSTOLIC BLOOD PRESSURE: 128 MMHG | TEMPERATURE: 98.3 F

## 2023-09-26 DIAGNOSIS — M50.90 CERVICAL DISC DISEASE: ICD-10-CM

## 2023-09-26 DIAGNOSIS — M51.36 DDD (DEGENERATIVE DISC DISEASE), LUMBAR: ICD-10-CM

## 2023-09-26 DIAGNOSIS — F17.200 SMOKER: ICD-10-CM

## 2023-09-26 DIAGNOSIS — M54.9 MID BACK PAIN: ICD-10-CM

## 2023-09-26 DIAGNOSIS — F32.A DEPRESSION, UNSPECIFIED DEPRESSION TYPE: ICD-10-CM

## 2023-09-26 DIAGNOSIS — Z00.00 ROUTINE GENERAL MEDICAL EXAMINATION AT A HEALTH CARE FACILITY: Primary | ICD-10-CM

## 2023-09-26 DIAGNOSIS — I10 ESSENTIAL HYPERTENSION: ICD-10-CM

## 2023-09-26 DIAGNOSIS — Z23 NEED FOR TDAP VACCINATION: ICD-10-CM

## 2023-09-26 DIAGNOSIS — R79.89 ELEVATED LFTS: Primary | ICD-10-CM

## 2023-09-26 DIAGNOSIS — E03.9 ACQUIRED HYPOTHYROIDISM: ICD-10-CM

## 2023-09-26 DIAGNOSIS — E78.1 HYPERTRIGLYCERIDEMIA: ICD-10-CM

## 2023-09-26 DIAGNOSIS — R79.89 ELEVATED LFTS: ICD-10-CM

## 2023-09-26 DIAGNOSIS — R93.1 ABNORMAL SCREENING CARDIAC CT: ICD-10-CM

## 2023-09-26 DIAGNOSIS — S22.070A CLOSED WEDGE COMPRESSION FRACTURE OF T9 VERTEBRA, INITIAL ENCOUNTER (HCC): ICD-10-CM

## 2023-09-26 PROBLEM — I25.10 CAD (CORONARY ARTERY DISEASE): Status: ACTIVE | Noted: 2022-04-01

## 2023-09-26 PROBLEM — M51.34 DDD (DEGENERATIVE DISC DISEASE), THORACIC: Status: ACTIVE | Noted: 2023-09-01

## 2023-09-26 PROBLEM — M41.9 SCOLIOSIS OF THORACIC SPINE: Status: ACTIVE | Noted: 2023-09-01

## 2023-09-26 PROCEDURE — 72072 X-RAY EXAM THORAC SPINE 3VWS: CPT

## 2023-09-26 RX ORDER — BUPROPION HYDROCHLORIDE 150 MG/1
150 TABLET ORAL EVERY MORNING
Qty: 30 TABLET | Refills: 0 | Status: SHIPPED | OUTPATIENT
Start: 2023-09-26

## 2023-09-26 RX ORDER — TRAMADOL HYDROCHLORIDE 50 MG/1
50 TABLET ORAL EVERY 8 HOURS PRN
Qty: 30 TABLET | Refills: 0 | Status: SHIPPED | OUTPATIENT
Start: 2023-09-26 | End: 2023-10-26

## 2023-09-26 RX ORDER — BUPROPION HYDROCHLORIDE 300 MG/1
300 TABLET ORAL EVERY MORNING
Qty: 90 TABLET | Refills: 1 | Status: SHIPPED | OUTPATIENT
Start: 2023-09-26

## 2023-09-26 SDOH — ECONOMIC STABILITY: INCOME INSECURITY: HOW HARD IS IT FOR YOU TO PAY FOR THE VERY BASICS LIKE FOOD, HOUSING, MEDICAL CARE, AND HEATING?: NOT HARD AT ALL

## 2023-09-26 SDOH — ECONOMIC STABILITY: FOOD INSECURITY: WITHIN THE PAST 12 MONTHS, THE FOOD YOU BOUGHT JUST DIDN'T LAST AND YOU DIDN'T HAVE MONEY TO GET MORE.: NEVER TRUE

## 2023-09-26 SDOH — ECONOMIC STABILITY: HOUSING INSECURITY
IN THE LAST 12 MONTHS, WAS THERE A TIME WHEN YOU DID NOT HAVE A STEADY PLACE TO SLEEP OR SLEPT IN A SHELTER (INCLUDING NOW)?: NO

## 2023-09-26 SDOH — ECONOMIC STABILITY: FOOD INSECURITY: WITHIN THE PAST 12 MONTHS, YOU WORRIED THAT YOUR FOOD WOULD RUN OUT BEFORE YOU GOT MONEY TO BUY MORE.: NEVER TRUE

## 2023-09-26 ASSESSMENT — PATIENT HEALTH QUESTIONNAIRE - PHQ9
3. TROUBLE FALLING OR STAYING ASLEEP: 0
SUM OF ALL RESPONSES TO PHQ9 QUESTIONS 1 & 2: 2
SUM OF ALL RESPONSES TO PHQ QUESTIONS 1-9: 4
5. POOR APPETITE OR OVEREATING: 0
6. FEELING BAD ABOUT YOURSELF - OR THAT YOU ARE A FAILURE OR HAVE LET YOURSELF OR YOUR FAMILY DOWN: 0
2. FEELING DOWN, DEPRESSED OR HOPELESS: 1
1. LITTLE INTEREST OR PLEASURE IN DOING THINGS: 1
4. FEELING TIRED OR HAVING LITTLE ENERGY: 1
SUM OF ALL RESPONSES TO PHQ QUESTIONS 1-9: 4
10. IF YOU CHECKED OFF ANY PROBLEMS, HOW DIFFICULT HAVE THESE PROBLEMS MADE IT FOR YOU TO DO YOUR WORK, TAKE CARE OF THINGS AT HOME, OR GET ALONG WITH OTHER PEOPLE: 1
SUM OF ALL RESPONSES TO PHQ QUESTIONS 1-9: 4
SUM OF ALL RESPONSES TO PHQ QUESTIONS 1-9: 4
7. TROUBLE CONCENTRATING ON THINGS, SUCH AS READING THE NEWSPAPER OR WATCHING TELEVISION: 1
9. THOUGHTS THAT YOU WOULD BE BETTER OFF DEAD, OR OF HURTING YOURSELF: 0
8. MOVING OR SPEAKING SO SLOWLY THAT OTHER PEOPLE COULD HAVE NOTICED. OR THE OPPOSITE, BEING SO FIGETY OR RESTLESS THAT YOU HAVE BEEN MOVING AROUND A LOT MORE THAN USUAL: 0

## 2023-09-26 NOTE — PROGRESS NOTES
Patient is a 56y.o. year old male presenting for a complete physical today. Last Colonoscopy - [facility] at 51 yo - due at 55 yo- did early 2023- Dr. Ruthanna Moritz. PSA = 0.37 (9/23)  Last eye exam: 1.5 years ago- night vision is decreased  Current smoker: vaping now since 8/22, but not smoking for 8-9 years. Exercise: lifting weight 3 d/ week;  runs [employer] - 6 miles, but hurt foot and backed off for 10 weeks. Was back to mix walk /run 7 miles,but walking only now. Caffeine: 4-5/ day - coffee/ Diet Mountain Dew  Alcohol: - not regular but more the last month - 2-3 drinks /day - 30-40 beers in 3-4 weeks. No alcohol in the past 10 days. CT cardiac score = 363 in 4/22. He did see cardiologist - Dr. Von Osborn with [facility]. His liver function tests Boswellia extract and Ibuprofen. - 3-4 pills / day for back pain- stopped 5 days ago except on Saturday took 3 pills . He is tolerating Lipitor 40 mg daily without side effects . He started with mid back pain after vomiting on boat 3 +weeks ago. 5/10 - dull and fairly constant. Prolonged sitting flares the pain. Better with standing and walking. Sleeping okay with Melatonin 5-10 mg qhs. No radiation or weakness to arms/ legs. He had fish tacos 1 hour prior to getting on fishing boat for 24 hours. He developed emesis lasted 18 hours, but no diarrhea. Occasional heartburn. No abdominal pain now. Mild constipation now. Last bowel movement was this am - small and firm. He plans to take Miralax to help. He wants to quit smoking and has been successful with Wellbutrin  mg. Past Medical History:   Diagnosis Date    DDD (degenerative disc disease), lumbar     L4-5 ; L5-S1    External hemorrhoid     Hypertriglyceridemia     Psoriasis     Sprain of thoracic region         Review of patient's past surgical history indicates:   No past surgical history on file.                                                 Current Outpatient Medications
hold on oral steroids until compression fracture ruled out with onset of pain with emesis and after fall. - If compression fracture found, will send in Tramadol to the pharmacy for pain to use mostly at night. OARRS reviewed. 4. Depression, unspecified depression type  - Restart Wellbutrin  mg qd X 2-4 weeks and if tolerating increase back to prior dose of Wellbutrin  mg qd , which was most effective for smoking cessation in the past.    - buPROPion (WELLBUTRIN XL) 150 MG extended release tablet; Take 1 tablet by mouth every morning  Dispense: 30 tablet; Refill: 0  - buPROPion (WELLBUTRIN XL) 300 MG extended release tablet; Take 1 tablet by mouth every morning  Dispense: 90 tablet; Refill: 1    5. DDD (degenerative disc disease), lumbar  - Stable. Moist heat . ROM exercises. Modify activities. 6. Cervical disc disease  - Stable. Moist heat . ROM exercises. Modify activities. 7. Elevated LFTs  - Liver function tests are elevated but likely NSAID use and alcohol contributed, as drinking more than usual due to being on vacation and fishing trip. - Avoid Tylenol, NSAIDs (Ibuprofen, Advil, Motrin, or Aleve) , and alcohol. Recheck LFTs in 6-8 weeks. 8. Acquired hypothyroidism  - Stable. Continue Synthroid 0.125 mg qd. 9. Abnormal screening cardiac CT  - Previously saw cardiologist, but no change in medications or stress test was done. - Reviewed CT cardiac scoring = 363 in 4/22.  - Continue Aspirin 81 mg qd and Lipitor 40 mg qd. Prefer to hold on increasing to 80 mg qd given recent elevation in liver function tests . 10. Hypertriglyceridemia  - Stable. Continue Lipitor 40 mg qd and Follow a low cholesterol diet, including cardiovascular exercise > 150 minutes/ week. 11. Smoker  - Patient advised and encouraged to quit smoking. Health risks and aides discussed.   - Restart Wellbutrin  mg qd X 2-4 weeks and if tolerating increase back to prior dose of Wellbutrin XL

## 2023-09-26 NOTE — PATIENT INSTRUCTIONS
Exercise goal is to continue cardiovascular exercise to = 150 minutes / week . Exercise goal is to incorporate strength training, including weights / resistance bands/ pilates/ yoga/ 40 minutes per week. Questionnaires need to be given.

## 2023-09-29 ENCOUNTER — TELEPHONE (OUTPATIENT)
Age: 58
End: 2023-09-29

## 2023-09-29 NOTE — TELEPHONE ENCOUNTER
Pt called asking about restrictions for movement with his daily life. Any specific movements/activities he should avoid? Pt reports:  Sitting for prolonged periods of time causes discomfort. Pt feels better while walking- is this okay to keep going? 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 Would flying be okay or does he have to limit himself with travel. Okay for phone call or return [employer] message. Also okay to make appt for Monday in-person or virtual if further discussion is warranted.

## 2023-09-29 NOTE — TELEPHONE ENCOUNTER
Spoke with pt, who understands all instructions and information. Pt says that the tramadol and pain patches are working quite well. Encouraged pt to acquire a back brace for his travels.

## 2023-09-29 NOTE — TELEPHONE ENCOUNTER
Activity is as tolerated with mild T9 compression fracture, which likely occurred 3+ weeks ago as discussed. He does have multi-level thoracic degenerative disc disease as well . In regard to flying it is fine, but he should watch lifting anything > 15 lbs, especially overhead. A back support/brace may be helpful if he continues to have back pain and to use as needed with activity / including travel to minimize flares. Inquire how Tramadol is helping? Remind he can use Lidocaine patches if needed- 12 hours on and 12 hours off. If sleeping okay use during the day . If not sleeping okay use at night.

## 2023-10-08 DIAGNOSIS — F32.A DEPRESSION, UNSPECIFIED DEPRESSION TYPE: ICD-10-CM

## 2023-10-08 DIAGNOSIS — I10 ESSENTIAL HYPERTENSION: ICD-10-CM

## 2023-10-08 DIAGNOSIS — S22.070A CLOSED WEDGE COMPRESSION FRACTURE OF T9 VERTEBRA, INITIAL ENCOUNTER (HCC): ICD-10-CM

## 2023-10-08 DIAGNOSIS — F17.200 SMOKER: ICD-10-CM

## 2023-10-09 RX ORDER — LISINOPRIL 10 MG/1
TABLET ORAL
Qty: 90 TABLET | Refills: 1 | Status: SHIPPED | OUTPATIENT
Start: 2023-10-09

## 2023-10-09 RX ORDER — TRAMADOL HYDROCHLORIDE 50 MG/1
TABLET ORAL
Qty: 30 TABLET | OUTPATIENT
Start: 2023-10-09

## 2023-10-09 RX ORDER — BUPROPION HYDROCHLORIDE 150 MG/1
150 TABLET ORAL EVERY MORNING
Qty: 30 TABLET | Refills: 0 | OUTPATIENT
Start: 2023-10-09

## 2023-10-09 NOTE — TELEPHONE ENCOUNTER
Just needs lisinopril. Has the 300 mg is taking that- does not need refill. Occasional sharp pain but very brief- no longer constant. Tramadol still has and using as needed but good for refills.

## 2023-10-09 NOTE — TELEPHONE ENCOUNTER
Requested Prescriptions     Pending Prescriptions Disp Refills    lisinopril (PRINIVIL;ZESTRIL) 10 MG tablet [Pharmacy Med Name: LISINOPRIL 10 MG TABLET] 90 tablet 1     Sig: TAKE ONE TABLET BY MOUTH DAILY    buPROPion (WELLBUTRIN XL) 150 MG extended release tablet [Pharmacy Med Name: buPROPion HCL  MG TABLET] 30 tablet 0     Sig: TAKE ONE TABLET BY MOUTH EVERY MORNING    traMADol (ULTRAM) 50 MG tablet [Pharmacy Med Name: traMADol HCL 50MG TABLET] 30 tablet      Sig: TAKE ONE TABLET BY MOUTH EVERY 8 HOURS AS NEEDED FOR PAIN FOR UP TO 30 DAYS          Last OV: 9/26/23    Last labs: 9/21/23     F/u: None

## 2023-10-09 NOTE — TELEPHONE ENCOUNTER
RXs were automatic refill request .  He is likely going to increase to Wellbutrin  mg daily - RX was given / printed. See if he needs other RX - Lisinopril or Tramadol.

## 2023-11-03 ENCOUNTER — TELEPHONE (OUTPATIENT)
Age: 58
End: 2023-11-03

## 2023-11-03 DIAGNOSIS — Z87.891 FORMER SMOKER: Primary | ICD-10-CM

## 2023-11-03 DIAGNOSIS — M48.50XA NONTRAUMATIC COMPRESSION FRACTURE OF VERTEBRA, INITIAL ENCOUNTER (HCC): ICD-10-CM

## 2023-11-03 NOTE — TELEPHONE ENCOUNTER
He is due for repeat CT scan low dose of lung now with prior done in 4/22. In addition  , I would like him to do DEXA scan given compression fracture of spine. Orders are in [address] [place] [address] 15. Please update smoking history . He is not currently smoking cigarettes, but is likely still using e-cigarettes and update how many years and amount smoked previously and when quit. Have him follow up after both are completed with an appointment ideally.

## 2023-11-03 NOTE — TELEPHONE ENCOUNTER
Chart updated     CT lung sent to [facility] scheduling number given to make dexa scan appt

## 2023-11-10 ENCOUNTER — HOSPITAL ENCOUNTER (OUTPATIENT)
Dept: GENERAL RADIOLOGY | Age: 58
Discharge: HOME | End: 2023-11-10
Payer: COMMERCIAL

## 2023-11-10 DIAGNOSIS — Z87.891 FORMER SMOKER: ICD-10-CM

## 2023-11-10 DIAGNOSIS — M48.50XA NONTRAUMATIC COMPRESSION FRACTURE OF VERTEBRA, INITIAL ENCOUNTER (HCC): ICD-10-CM

## 2023-11-10 PROCEDURE — 77080 DXA BONE DENSITY AXIAL: CPT

## 2023-11-12 PROBLEM — M85.88 OSTEOPENIA OF LUMBAR SPINE: Status: ACTIVE | Noted: 2023-11-12

## 2023-11-30 DIAGNOSIS — R79.89 ELEVATED LFTS: ICD-10-CM

## 2023-11-30 DIAGNOSIS — E03.9 ACQUIRED HYPOTHYROIDISM: ICD-10-CM

## 2023-11-30 LAB
ALBUMIN SERPL-MCNC: 4.9 G/DL (ref 3.4–5)
ALP SERPL-CCNC: 105 U/L (ref 40–129)
ALT SERPL-CCNC: 87 U/L (ref 10–40)
AST SERPL-CCNC: 49 U/L (ref 15–37)
BILIRUB DIRECT SERPL-MCNC: <0.2 MG/DL (ref 0–0.3)
BILIRUB INDIRECT SERPL-MCNC: ABNORMAL MG/DL (ref 0–1)
BILIRUB SERPL-MCNC: 0.4 MG/DL (ref 0–1)
PROT SERPL-MCNC: 7.2 G/DL (ref 6.4–8.2)
T4 FREE SERPL-MCNC: 1.4 NG/DL (ref 0.9–1.8)
TSH SERPL DL<=0.005 MIU/L-ACNC: 8.45 UIU/ML (ref 0.27–4.2)

## 2023-12-01 ENCOUNTER — OFFICE VISIT (OUTPATIENT)
Age: 58
End: 2023-12-01
Payer: COMMERCIAL

## 2023-12-01 VITALS
SYSTOLIC BLOOD PRESSURE: 134 MMHG | WEIGHT: 179.6 LBS | HEART RATE: 86 BPM | RESPIRATION RATE: 16 BRPM | DIASTOLIC BLOOD PRESSURE: 86 MMHG | TEMPERATURE: 97.6 F | BODY MASS INDEX: 27.31 KG/M2 | OXYGEN SATURATION: 100 %

## 2023-12-01 DIAGNOSIS — M85.88 OSTEOPENIA OF LUMBAR SPINE: ICD-10-CM

## 2023-12-01 DIAGNOSIS — R93.1 ABNORMAL SCREENING CARDIAC CT: Primary | ICD-10-CM

## 2023-12-01 DIAGNOSIS — E03.9 ACQUIRED HYPOTHYROIDISM: ICD-10-CM

## 2023-12-01 DIAGNOSIS — J06.9 UPPER RESPIRATORY TRACT INFECTION, UNSPECIFIED TYPE: ICD-10-CM

## 2023-12-01 DIAGNOSIS — R79.89 ELEVATED LFTS: ICD-10-CM

## 2023-12-01 DIAGNOSIS — I10 ESSENTIAL HYPERTENSION: ICD-10-CM

## 2023-12-01 DIAGNOSIS — S22.070A COMPRESSION FRACTURE OF T9 VERTEBRA, INITIAL ENCOUNTER (HCC): ICD-10-CM

## 2023-12-01 PROCEDURE — 3074F SYST BP LT 130 MM HG: CPT | Performed by: FAMILY MEDICINE

## 2023-12-01 PROCEDURE — G8417 CALC BMI ABV UP PARAM F/U: HCPCS | Performed by: FAMILY MEDICINE

## 2023-12-01 PROCEDURE — G8427 DOCREV CUR MEDS BY ELIG CLIN: HCPCS | Performed by: FAMILY MEDICINE

## 2023-12-01 PROCEDURE — 3017F COLORECTAL CA SCREEN DOC REV: CPT | Performed by: FAMILY MEDICINE

## 2023-12-01 PROCEDURE — 99214 OFFICE O/P EST MOD 30 MIN: CPT | Performed by: FAMILY MEDICINE

## 2023-12-01 PROCEDURE — G8482 FLU IMMUNIZE ORDER/ADMIN: HCPCS | Performed by: FAMILY MEDICINE

## 2023-12-01 PROCEDURE — 4004F PT TOBACCO SCREEN RCVD TLK: CPT | Performed by: FAMILY MEDICINE

## 2023-12-01 PROCEDURE — 3078F DIAST BP <80 MM HG: CPT | Performed by: FAMILY MEDICINE

## 2023-12-01 RX ORDER — BENZONATATE 200 MG/1
200 CAPSULE ORAL 3 TIMES DAILY PRN
Qty: 21 CAPSULE | Refills: 0 | Status: SHIPPED | OUTPATIENT
Start: 2023-12-01 | End: 2023-12-08

## 2023-12-01 RX ORDER — AZITHROMYCIN 250 MG/1
TABLET, FILM COATED ORAL
Qty: 1 PACKET | Refills: 0 | Status: SHIPPED | OUTPATIENT
Start: 2023-12-01

## 2023-12-04 ENCOUNTER — PATIENT MESSAGE (OUTPATIENT)
Age: 58
End: 2023-12-04

## 2023-12-04 DIAGNOSIS — K82.4 GALLBLADDER POLYP: ICD-10-CM

## 2023-12-04 DIAGNOSIS — K80.20 CALCULUS OF GALLBLADDER WITHOUT CHOLECYSTITIS WITHOUT OBSTRUCTION: Primary | ICD-10-CM

## 2023-12-04 PROBLEM — R16.0 HEPATOMEGALY: Status: ACTIVE | Noted: 2023-12-04

## 2023-12-11 ENCOUNTER — OFFICE VISIT (OUTPATIENT)
Dept: SURGERY | Age: 58
End: 2023-12-11
Payer: COMMERCIAL

## 2023-12-11 VITALS
BODY MASS INDEX: 27.83 KG/M2 | DIASTOLIC BLOOD PRESSURE: 67 MMHG | SYSTOLIC BLOOD PRESSURE: 111 MMHG | WEIGHT: 183 LBS | HEART RATE: 76 BPM

## 2023-12-11 DIAGNOSIS — S22.070A CLOSED WEDGE COMPRESSION FRACTURE OF T9 VERTEBRA, INITIAL ENCOUNTER (HCC): ICD-10-CM

## 2023-12-11 DIAGNOSIS — M50.90 CERVICAL DISC DISEASE: ICD-10-CM

## 2023-12-11 DIAGNOSIS — K80.20 SYMPTOMATIC CHOLELITHIASIS: Primary | ICD-10-CM

## 2023-12-11 DIAGNOSIS — E78.00 PURE HYPERCHOLESTEROLEMIA: ICD-10-CM

## 2023-12-11 PROCEDURE — G8427 DOCREV CUR MEDS BY ELIG CLIN: HCPCS | Performed by: SURGERY

## 2023-12-11 PROCEDURE — 3078F DIAST BP <80 MM HG: CPT | Performed by: SURGERY

## 2023-12-11 PROCEDURE — 4004F PT TOBACCO SCREEN RCVD TLK: CPT | Performed by: SURGERY

## 2023-12-11 PROCEDURE — 3074F SYST BP LT 130 MM HG: CPT | Performed by: SURGERY

## 2023-12-11 PROCEDURE — G8417 CALC BMI ABV UP PARAM F/U: HCPCS | Performed by: SURGERY

## 2023-12-11 PROCEDURE — 99204 OFFICE O/P NEW MOD 45 MIN: CPT | Performed by: SURGERY

## 2023-12-11 PROCEDURE — 3017F COLORECTAL CA SCREEN DOC REV: CPT | Performed by: SURGERY

## 2023-12-11 PROCEDURE — G8482 FLU IMMUNIZE ORDER/ADMIN: HCPCS | Performed by: SURGERY

## 2023-12-11 RX ORDER — CYCLOBENZAPRINE HCL 10 MG
TABLET ORAL
Qty: 90 TABLET | Refills: 1 | OUTPATIENT
Start: 2023-12-11

## 2023-12-11 RX ORDER — ATORVASTATIN CALCIUM 40 MG/1
40 TABLET, FILM COATED ORAL DAILY
Qty: 90 TABLET | Refills: 1 | Status: SHIPPED | OUTPATIENT
Start: 2023-12-11

## 2023-12-11 RX ORDER — TRAMADOL HYDROCHLORIDE 50 MG/1
TABLET ORAL
Qty: 30 TABLET | OUTPATIENT
Start: 2023-12-11

## 2023-12-11 NOTE — TELEPHONE ENCOUNTER
Requested Prescriptions     Pending Prescriptions Disp Refills    atorvastatin (LIPITOR) 40 MG tablet [Pharmacy Med Name: ATORVASTATIN 40 MG TABLET] 90 tablet 0     Sig: TAKE 1 TABLET BY MOUTH DAILY.  FOLLOW UP APPOINTMENT AND LABS ARE NEEDED    traMADol (ULTRAM) 50 MG tablet [Pharmacy Med Name: traMADol HCL 50MG TABLET] 30 tablet      Sig: TAKE ONE TABLET BY MOUTH EVERY 8 HOURS AS NEEDED FOR PAIN FOR UP TO 30 DAYS    cyclobenzaprine (FLEXERIL) 10 MG tablet [Pharmacy Med Name: CYCLOBENZAPRINE 10 MG TABLET] 90 tablet 1     Sig: TAKE ONE TABLET BY MOUTH EVERY 8 HOURS AS NEEDED FOR MUSCLE SPASMS        Last OV: 12/1/23    Last labs: 11/30/23     F/u: None

## 2023-12-11 NOTE — PROGRESS NOTES
PATIENT NAME: Cynthia Kramer     YOB: 1965     TODAY'S DATE: 2023    Reason for Visit:  Postprandial nausea    Requesting Physician:  Dr. Martín Robles:              The patient is a 66 y.o. male with a PMHx as delineated below who presents with several episodes of postprandial nausea. This is worse after fattier foods. Chief Complaint   Patient presents with    New Patient      NP - Calculus of gallbladder without cholecystitis without obstruction - Gallbladder polyp           REVIEW OF SYSTEMS:  CONSTITUTIONAL:  negative  HEENT:  negative  RESPIRATORY:  negative  CARDIOVASCULAR:  negative  GASTROINTESTINAL:  negative except for nausea  GENITOURINARY:  negative  HEMATOLOGIC/LYMPHATIC:  negative  MUSCULOSKELETAL: negative  NEUROLOGICAL:  negative    PMH  Past Medical History:   Diagnosis Date    CAD (coronary artery disease) 2022    CT cardiac score = 363 in     Cholelithiasis 2023    Compression fracture of T9 vertebra ([address]) 2023    DDD (degenerative disc disease), lumbar     L4-5 ; L5-S1    DDD (degenerative disc disease), thoracic 2023    per Xrays    Elevated LFTs 2023    External hemorrhoid     Gallbladder polyp 2023    per U/S    Hepatomegaly 2023    per U/S    Hypertriglyceridemia     Osteopenia of lumbar spine 2023    Psoriasis     Scoliosis 2023    per Xrays    Scoliosis of thoracic spine 2023    Sprain of thoracic region        [facility]  No past surgical history on file.     Social History  Social History     Socioeconomic History    Marital status:      Spouse name: Not on file    Number of children: Not on file    Years of education: Not on file    Highest education level: Not on file   Occupational History    Occupation:    Tobacco Use    Smoking status: Former     Packs/day: 1     Types: E-Cigarettes, Cigarettes     Start date: 1987     Quit date: 2016     Years since quittin.9    Smokeless tobacco:

## 2023-12-11 NOTE — TELEPHONE ENCOUNTER
I refilled Lipitor / Atorvastatin, but denied Tramadol and Flexeril as these came on automatic refill. Please verify with the patient these 2 are not needed though. If so advised to take off automatic refill.

## 2023-12-12 RX ORDER — CYCLOBENZAPRINE HCL 10 MG
10 TABLET ORAL 3 TIMES DAILY PRN
Qty: 25 TABLET | Refills: 0 | Status: SHIPPED | OUTPATIENT
Start: 2023-12-12 | End: 2023-12-22

## 2023-12-13 ENCOUNTER — TELEPHONE (OUTPATIENT)
Dept: SURGERY | Age: 58
End: 2023-12-13

## 2024-01-30 ENCOUNTER — TELEPHONE (OUTPATIENT)
Age: 59
End: 2024-01-30

## 2024-01-30 NOTE — TELEPHONE ENCOUNTER
An appointment is preferred since he is going on trip and had re-injury .  Ensure no fever or radiation /weakness to legs? No bowel/ bladder incontinence ?  Sleeping difficulties?

## 2024-01-30 NOTE — TELEPHONE ENCOUNTER
Pt called stating he has had ongoing back pains and recently he lifted a microwave the wrong way and is now having back pains and would like a medication sent to the pharmacy. He stated he finished his tramadol and also stated He leaves for [place] on Monday and does not want to sit on a plane that long with a sore back. Please advise.

## 2024-02-01 ENCOUNTER — OFFICE VISIT (OUTPATIENT)
Age: 59
End: 2024-02-01
Payer: COMMERCIAL

## 2024-02-01 VITALS
HEART RATE: 66 BPM | OXYGEN SATURATION: 97 % | SYSTOLIC BLOOD PRESSURE: 112 MMHG | WEIGHT: 181.8 LBS | TEMPERATURE: 97.7 F | RESPIRATION RATE: 16 BRPM | BODY MASS INDEX: 27.64 KG/M2 | DIASTOLIC BLOOD PRESSURE: 72 MMHG

## 2024-02-01 DIAGNOSIS — S22.070A CLOSED WEDGE COMPRESSION FRACTURE OF T9 VERTEBRA, INITIAL ENCOUNTER (HCC): ICD-10-CM

## 2024-02-01 DIAGNOSIS — R79.89 ELEVATED LFTS: ICD-10-CM

## 2024-02-01 DIAGNOSIS — M50.90 CERVICAL DISC DISEASE: Primary | ICD-10-CM

## 2024-02-01 DIAGNOSIS — M54.6 ACUTE RIGHT-SIDED THORACIC BACK PAIN: ICD-10-CM

## 2024-02-01 DIAGNOSIS — M51.36 DDD (DEGENERATIVE DISC DISEASE), LUMBAR: ICD-10-CM

## 2024-02-01 PROCEDURE — G8427 DOCREV CUR MEDS BY ELIG CLIN: HCPCS | Performed by: FAMILY MEDICINE

## 2024-02-01 PROCEDURE — 4004F PT TOBACCO SCREEN RCVD TLK: CPT | Performed by: FAMILY MEDICINE

## 2024-02-01 PROCEDURE — 3074F SYST BP LT 130 MM HG: CPT | Performed by: FAMILY MEDICINE

## 2024-02-01 PROCEDURE — G8482 FLU IMMUNIZE ORDER/ADMIN: HCPCS | Performed by: FAMILY MEDICINE

## 2024-02-01 PROCEDURE — 80305 DRUG TEST PRSMV DIR OPT OBS: CPT | Performed by: FAMILY MEDICINE

## 2024-02-01 PROCEDURE — 3017F COLORECTAL CA SCREEN DOC REV: CPT | Performed by: FAMILY MEDICINE

## 2024-02-01 PROCEDURE — 3078F DIAST BP <80 MM HG: CPT | Performed by: FAMILY MEDICINE

## 2024-02-01 PROCEDURE — 99214 OFFICE O/P EST MOD 30 MIN: CPT | Performed by: FAMILY MEDICINE

## 2024-02-01 PROCEDURE — G8417 CALC BMI ABV UP PARAM F/U: HCPCS | Performed by: FAMILY MEDICINE

## 2024-02-01 RX ORDER — CYCLOBENZAPRINE HCL 10 MG
TABLET ORAL
Qty: 90 TABLET | Refills: 1 | Status: SHIPPED | OUTPATIENT
Start: 2024-02-01

## 2024-02-01 RX ORDER — TRAMADOL HYDROCHLORIDE 50 MG/1
50 TABLET ORAL EVERY 8 HOURS PRN
Qty: 30 TABLET | Refills: 0 | Status: SHIPPED | OUTPATIENT
Start: 2024-02-01 | End: 2024-03-02

## 2024-02-01 RX ORDER — METHYLPREDNISOLONE 4 MG/1
TABLET ORAL
Qty: 1 KIT | Refills: 0 | Status: SHIPPED | OUTPATIENT
Start: 2024-02-01 | End: 2024-02-07

## 2024-02-01 ASSESSMENT — PATIENT HEALTH QUESTIONNAIRE - PHQ9
6. FEELING BAD ABOUT YOURSELF - OR THAT YOU ARE A FAILURE OR HAVE LET YOURSELF OR YOUR FAMILY DOWN: 0
7. TROUBLE CONCENTRATING ON THINGS, SUCH AS READING THE NEWSPAPER OR WATCHING TELEVISION: 1
10. IF YOU CHECKED OFF ANY PROBLEMS, HOW DIFFICULT HAVE THESE PROBLEMS MADE IT FOR YOU TO DO YOUR WORK, TAKE CARE OF THINGS AT HOME, OR GET ALONG WITH OTHER PEOPLE: 0
SUM OF ALL RESPONSES TO PHQ9 QUESTIONS 1 & 2: 2
SUM OF ALL RESPONSES TO PHQ QUESTIONS 1-9: 5
1. LITTLE INTEREST OR PLEASURE IN DOING THINGS: 1
4. FEELING TIRED OR HAVING LITTLE ENERGY: 0
SUM OF ALL RESPONSES TO PHQ QUESTIONS 1-9: 5
2. FEELING DOWN, DEPRESSED OR HOPELESS: 1
8. MOVING OR SPEAKING SO SLOWLY THAT OTHER PEOPLE COULD HAVE NOTICED. OR THE OPPOSITE, BEING SO FIGETY OR RESTLESS THAT YOU HAVE BEEN MOVING AROUND A LOT MORE THAN USUAL: 1
5. POOR APPETITE OR OVEREATING: 0
SUM OF ALL RESPONSES TO PHQ QUESTIONS 1-9: 5
SUM OF ALL RESPONSES TO PHQ QUESTIONS 1-9: 5
9. THOUGHTS THAT YOU WOULD BE BETTER OFF DEAD, OR OF HURTING YOURSELF: 0
3. TROUBLE FALLING OR STAYING ASLEEP: 1

## 2024-02-01 NOTE — PROGRESS NOTES
Patient is here for low back pain and right sided mid back pain. He flared it while lifting up microwave out of back seat of car.  He has been not walking /running , but not since bad weather. He was running 2 miles usually.  No radiation or weakness to legs. No bowel/ bladder incontinence.   Right , lower back pain to spine- 2-3/10 constant.  Some new right mid back pain with turning over and certain movements, which flared with microwave lifting, which is quick, sharp . 5/10.   No alcohol or NSADs due to elevated liver function tests .      He is going to [place] with his wife on Monday for 1+ week.     Uses THC at night.  Used Valium for dental work 1-2 weeks ago.      Review of Systems    ROS: All other systems were reviewed and are negative .  Patient's allergies and medications were reviewed.  Patient's past medical, surgical, social , and family history were reviewed.    Results for POC orders placed in visit on 02/01/24   POCT Rapid Drug Screen   Result Value Ref Range    Alcohol, Urine      Amphetamine Screen, Urine NEG     Barbiturate Screen, Urine      Benzodiazepine Screen, Urine NEG     Buprenorphine Urine NEG     Cocaine Metabolite Screen, Urine NEG     FENTANYL SCREEN, URINE      Gabapentin Screen, Urine      MDMA, Urine NEG     Methadone Screen, Urine NEG     Methamphetamine, Urine NEG     Opiate Scrn, Ur NEG     Oxycodone Screen, Ur NEG     PCP Screen, Urine      Propoxyphene Screen, Urine      Synthetic Cannabinoids (K2) Screen, Urine      THC Screen, Urine POS     Tramadol Scrn, Ur      Tricyclic Antidepressants, Urine         OBJECTIVE:  /72 (Site: Right Upper Arm, Position: Sitting)   Pulse 66   Temp 97.7 °F (36.5 °C) (Temporal)   Resp 16   Wt 82.5 kg (181 lb 12.8 oz)   SpO2 97%   BMI 27.64 kg/m²     Physical Exam    General: NAD, cooperative, alert and oriented X 3. Mood / affect is good.good insight. well hydrated.  Neck : no lymphadenopathy, supple, mild decreased

## 2024-02-17 ENCOUNTER — TELEPHONE (OUTPATIENT)
Age: 59
End: 2024-02-17

## 2024-02-17 DIAGNOSIS — U07.1 COVID-19: Primary | ICD-10-CM

## 2024-02-17 NOTE — TELEPHONE ENCOUNTER
Patient called after hours due to testing positive for COVID this pm. He started with symptoms yesterday < 24 hours.  No fever, but does not have thermometer, but advised to get one and pulse oximeter.  Slight cough . Nasal congestion and fatigue. Aches and headache .  Denies shortness of breath or wheezing.   No nausea, vomiting, diarrhea .  Advised to avoid using Sildenafil with Paxlovid .  Paxlovid was sent to the pharmacy  3pills po bid X 5 days.  (Switched to [facility] due to [facility] no longer open).  Push fluids - water. Get plenty of rest and eat well balanced.  You should self quarantine for 5 days from onset of symptoms and be fever free for > 24 hours, which ever is longer.  To ED if persistent shortness of breath or pulse ox < 90 % occurs.

## 2024-03-28 DIAGNOSIS — E03.9 ACQUIRED HYPOTHYROIDISM: ICD-10-CM

## 2024-03-28 RX ORDER — LEVOTHYROXINE SODIUM 0.12 MG/1
125 TABLET ORAL DAILY
Qty: 90 TABLET | Refills: 0 | Status: SHIPPED | OUTPATIENT
Start: 2024-03-28

## 2024-03-28 NOTE — TELEPHONE ENCOUNTER
Requested Prescriptions     Pending Prescriptions Disp Refills    levothyroxine (SYNTHROID) 125 MCG tablet [Pharmacy Med Name: LEVOTHYROXINE 125 MCG TABLET] 90 tablet 0     Sig: TAKE 1 TABLET BY MOUTH DAILY         Last OV: 4/14/2022    Last labs: 11/30/2023     F/u: Visit date not found

## 2024-04-03 DIAGNOSIS — I10 ESSENTIAL HYPERTENSION: ICD-10-CM

## 2024-04-03 RX ORDER — LISINOPRIL 10 MG/1
TABLET ORAL
Qty: 90 TABLET | Refills: 1 | Status: SHIPPED | OUTPATIENT
Start: 2024-04-03

## 2024-04-03 NOTE — TELEPHONE ENCOUNTER
Requested Prescriptions     Pending Prescriptions Disp Refills    lisinopril (PRINIVIL;ZESTRIL) 10 MG tablet [Pharmacy Med Name: LISINOPRIL 10 MG TABLET] 90 tablet 1     Sig: TAKE 1 TABLET BY MOUTH DAILY      Last OV: 2/1/24    Last labs: 9/21/23     F/u: None

## 2024-05-21 DIAGNOSIS — E03.9 ACQUIRED HYPOTHYROIDISM: ICD-10-CM

## 2024-05-21 RX ORDER — LEVOTHYROXINE SODIUM 0.12 MG/1
125 TABLET ORAL DAILY
Qty: 90 TABLET | Refills: 0 | Status: SHIPPED | OUTPATIENT
Start: 2024-05-21 | End: 2024-05-23 | Stop reason: SDUPTHER

## 2024-05-21 NOTE — TELEPHONE ENCOUNTER
Requested Prescriptions     Pending Prescriptions Disp Refills    levothyroxine (SYNTHROID) 125 MCG tablet 90 tablet 0     Sig: Take 1 tablet by mouth Daily         Last OV: 2/1/2024    Last labs: 11/30/2023     F/u: Visit date not found    Incoming fax request

## 2024-05-23 DIAGNOSIS — E03.9 ACQUIRED HYPOTHYROIDISM: ICD-10-CM

## 2024-05-23 RX ORDER — LEVOTHYROXINE SODIUM 0.12 MG/1
125 TABLET ORAL DAILY
Qty: 90 TABLET | Refills: 0 | Status: SHIPPED | OUTPATIENT
Start: 2024-05-23

## 2024-05-23 NOTE — TELEPHONE ENCOUNTER
Sent on 5/21 pharmacy claims they never received rx per pt. Would like resent.   Requested Prescriptions     Pending Prescriptions Disp Refills    levothyroxine (SYNTHROID) 125 MCG tablet 90 tablet 0     Sig: Take 1 tablet by mouth Daily FOLLOW UP LABS ARE NEEDED.         Last OV: 2/1/2024    Last labs: 11/30/2023     F/u: Visit date not found

## 2024-05-23 NOTE — TELEPHONE ENCOUNTER
levothyroxine (SYNTHROID) 125 MCG tablet     LAST OFFICE VISIT ; 02/01/24    NO UPCOMING APPOINTMENTS     [facility] [id number] - [place], [place] - [address] - P [phone] - F [phone] [address] [place] [address]  Phone: [phone]  Fax: [phone]

## 2024-06-04 DIAGNOSIS — F32.A DEPRESSION, UNSPECIFIED DEPRESSION TYPE: ICD-10-CM

## 2024-06-04 DIAGNOSIS — F17.200 SMOKER: ICD-10-CM

## 2024-06-04 RX ORDER — SILDENAFIL 100 MG/1
TABLET, FILM COATED ORAL
Qty: 18 TABLET | Refills: 2 | Status: SHIPPED | OUTPATIENT
Start: 2024-06-04

## 2024-06-04 RX ORDER — BUPROPION HYDROCHLORIDE 150 MG/1
150 TABLET ORAL EVERY MORNING
Qty: 30 TABLET | Refills: 0 | OUTPATIENT
Start: 2024-06-04

## 2024-06-04 NOTE — TELEPHONE ENCOUNTER
Requested Prescriptions     Pending Prescriptions Disp Refills    sildenafil (VIAGRA) 100 MG tablet [Pharmacy Med Name: SILDENAFIL 100 MG TABLET] 18 tablet 2     Sig: TAKE ONE TABLET BY MOUTH AS NEEDED FOR ERECTILE DYSFUNCTION         Last OV: 2/1/2024    Last labs: 11/30/2023     F/u: 6/4/2024

## 2024-06-06 DIAGNOSIS — I25.10 CORONARY ARTERY DISEASE INVOLVING NATIVE CORONARY ARTERY OF NATIVE HEART WITHOUT ANGINA PECTORIS: ICD-10-CM

## 2024-06-06 DIAGNOSIS — E78.00 PURE HYPERCHOLESTEROLEMIA: ICD-10-CM

## 2024-06-06 DIAGNOSIS — E03.9 ACQUIRED HYPOTHYROIDISM: ICD-10-CM

## 2024-06-06 DIAGNOSIS — R79.89 ELEVATED LFTS: Primary | ICD-10-CM

## 2024-06-06 RX ORDER — ATORVASTATIN CALCIUM 40 MG/1
40 TABLET, FILM COATED ORAL DAILY
Qty: 90 TABLET | Refills: 0 | Status: SHIPPED | OUTPATIENT
Start: 2024-06-06

## 2024-06-06 NOTE — TELEPHONE ENCOUNTER
Requested Prescriptions     Pending Prescriptions Disp Refills    atorvastatin (LIPITOR) 40 MG tablet [Pharmacy Med Name: ATORVASTATIN 40 MG TABLET] 90 tablet 1     Sig: TAKE 1 TABLET BY MOUTH DAILY         Last OV: 2/1/2024    Last labs: 11/30/2023     F/u: Visit date not found

## 2024-06-06 NOTE — TELEPHONE ENCOUNTER
RX sent. Orders are in Epic for fasting labs , including thyroid studies. Please inform the patient .  He is due for CPE end of 9/23 - FYI.

## 2024-08-26 DIAGNOSIS — E78.00 PURE HYPERCHOLESTEROLEMIA: ICD-10-CM

## 2024-08-26 RX ORDER — ATORVASTATIN CALCIUM 40 MG/1
TABLET, FILM COATED ORAL
Qty: 90 TABLET | Refills: 0 | Status: SHIPPED | OUTPATIENT
Start: 2024-08-26

## 2024-08-26 NOTE — TELEPHONE ENCOUNTER
Requested Prescriptions     Pending Prescriptions Disp Refills    atorvastatin (LIPITOR) 40 MG tablet [Pharmacy Med Name: ATORVASTATIN 40 MG TABLET] 90 tablet 0     Sig: TAKE 1 TABLET BY MOUTH DAILY - FOLLOW UP LABS ARE NEEDED         Last OV: 2/1/2024    Last labs: 11/30/23     F/u: Visit date not found

## 2024-09-29 DIAGNOSIS — I10 ESSENTIAL HYPERTENSION: ICD-10-CM

## 2024-09-30 RX ORDER — LISINOPRIL 10 MG/1
TABLET ORAL
Qty: 90 TABLET | Refills: 0 | Status: SHIPPED | OUTPATIENT
Start: 2024-09-30 | End: 2024-10-28 | Stop reason: SDUPTHER

## 2024-09-30 NOTE — TELEPHONE ENCOUNTER
Requested Prescriptions     Pending Prescriptions Disp Refills    lisinopril (PRINIVIL;ZESTRIL) 10 MG tablet [Pharmacy Med Name: LISINOPRIL 10 MG TABLET] 90 tablet 1     Sig: TAKE 1 TABLET BY MOUTH DAILY         Last OV: 2/1/2024    Last labs: 11/30/223     F/u: Visit date not found

## 2024-09-30 NOTE — TELEPHONE ENCOUNTER
Left detailed message concerning need to complete outstanding labs and to schedule office visit as soon as he can.

## 2024-10-25 DIAGNOSIS — E03.9 ACQUIRED HYPOTHYROIDISM: ICD-10-CM

## 2024-10-25 DIAGNOSIS — R79.89 ELEVATED LFTS: ICD-10-CM

## 2024-10-25 DIAGNOSIS — I25.10 CORONARY ARTERY DISEASE INVOLVING NATIVE CORONARY ARTERY OF NATIVE HEART WITHOUT ANGINA PECTORIS: ICD-10-CM

## 2024-10-25 DIAGNOSIS — E78.00 PURE HYPERCHOLESTEROLEMIA: ICD-10-CM

## 2024-10-25 LAB
ALBUMIN SERPL-MCNC: 4.8 G/DL (ref 3.4–5)
ALBUMIN/GLOB SERPL: 2.4 {RATIO} (ref 1.1–2.2)
ALP SERPL-CCNC: 65 U/L (ref 40–129)
ALT SERPL-CCNC: 30 U/L (ref 10–40)
ANION GAP SERPL CALCULATED.3IONS-SCNC: 9 MMOL/L (ref 3–16)
AST SERPL-CCNC: 27 U/L (ref 15–37)
BILIRUB SERPL-MCNC: 0.4 MG/DL (ref 0–1)
BUN SERPL-MCNC: 16 MG/DL (ref 7–20)
CALCIUM SERPL-MCNC: 10 MG/DL (ref 8.3–10.6)
CHLORIDE SERPL-SCNC: 102 MMOL/L (ref 99–110)
CHOLEST SERPL-MCNC: 95 MG/DL (ref 0–199)
CO2 SERPL-SCNC: 27 MMOL/L (ref 21–32)
CREAT SERPL-MCNC: 0.9 MG/DL (ref 0.9–1.3)
GFR SERPLBLD CREATININE-BSD FMLA CKD-EPI: >90 ML/MIN/{1.73_M2}
GLUCOSE SERPL-MCNC: 97 MG/DL (ref 70–99)
HDLC SERPL-MCNC: 34 MG/DL (ref 40–60)
LDLC SERPL CALC-MCNC: 40 MG/DL
POTASSIUM SERPL-SCNC: 4.5 MMOL/L (ref 3.5–5.1)
PROT SERPL-MCNC: 6.8 G/DL (ref 6.4–8.2)
SODIUM SERPL-SCNC: 138 MMOL/L (ref 136–145)
T4 FREE SERPL-MCNC: 1.4 NG/DL (ref 0.9–1.8)
TRIGL SERPL-MCNC: 104 MG/DL (ref 0–150)
TSH SERPL DL<=0.005 MIU/L-ACNC: 1.2 UIU/ML (ref 0.27–4.2)
VLDLC SERPL CALC-MCNC: 21 MG/DL

## 2024-10-28 ENCOUNTER — OFFICE VISIT (OUTPATIENT)
Age: 59
End: 2024-10-28

## 2024-10-28 VITALS
HEIGHT: 68 IN | HEART RATE: 58 BPM | DIASTOLIC BLOOD PRESSURE: 76 MMHG | TEMPERATURE: 97.5 F | RESPIRATION RATE: 16 BRPM | SYSTOLIC BLOOD PRESSURE: 114 MMHG | WEIGHT: 181.3 LBS | OXYGEN SATURATION: 98 % | BODY MASS INDEX: 27.48 KG/M2

## 2024-10-28 DIAGNOSIS — K82.4 GALLBLADDER POLYP: ICD-10-CM

## 2024-10-28 DIAGNOSIS — E03.9 ACQUIRED HYPOTHYROIDISM: ICD-10-CM

## 2024-10-28 DIAGNOSIS — S22.070A COMPRESSION FRACTURE OF T9 VERTEBRA, INITIAL ENCOUNTER (HCC): ICD-10-CM

## 2024-10-28 DIAGNOSIS — M51.360 DEGENERATION OF INTERVERTEBRAL DISC OF LUMBAR REGION WITH DISCOGENIC BACK PAIN: ICD-10-CM

## 2024-10-28 DIAGNOSIS — I25.10 CORONARY ARTERY DISEASE INVOLVING NATIVE CORONARY ARTERY OF NATIVE HEART WITHOUT ANGINA PECTORIS: ICD-10-CM

## 2024-10-28 DIAGNOSIS — Z00.00 ROUTINE GENERAL MEDICAL EXAMINATION AT A HEALTH CARE FACILITY: Primary | ICD-10-CM

## 2024-10-28 DIAGNOSIS — E66.3 OVERWEIGHT WITH BODY MASS INDEX (BMI) OF 27 TO 27.9 IN ADULT: ICD-10-CM

## 2024-10-28 DIAGNOSIS — M51.34 DDD (DEGENERATIVE DISC DISEASE), THORACIC: ICD-10-CM

## 2024-10-28 DIAGNOSIS — R93.1 ABNORMAL SCREENING CARDIAC CT: ICD-10-CM

## 2024-10-28 DIAGNOSIS — M50.90 CERVICAL DISC DISEASE: ICD-10-CM

## 2024-10-28 DIAGNOSIS — N48.6 PEYRONIE'S DISEASE: ICD-10-CM

## 2024-10-28 DIAGNOSIS — M41.9 SCOLIOSIS OF THORACIC SPINE, UNSPECIFIED SCOLIOSIS TYPE: ICD-10-CM

## 2024-10-28 DIAGNOSIS — F32.A DEPRESSION, UNSPECIFIED DEPRESSION TYPE: ICD-10-CM

## 2024-10-28 DIAGNOSIS — N52.9 ERECTILE DYSFUNCTION, UNSPECIFIED ERECTILE DYSFUNCTION TYPE: ICD-10-CM

## 2024-10-28 DIAGNOSIS — R16.0 HEPATOMEGALY: ICD-10-CM

## 2024-10-28 DIAGNOSIS — Z72.89 CURRENT VAPING ON SOME DAYS: ICD-10-CM

## 2024-10-28 DIAGNOSIS — E78.00 PURE HYPERCHOLESTEROLEMIA: ICD-10-CM

## 2024-10-28 DIAGNOSIS — M85.88 OSTEOPENIA OF LUMBAR SPINE: ICD-10-CM

## 2024-10-28 DIAGNOSIS — I10 ESSENTIAL HYPERTENSION: ICD-10-CM

## 2024-10-28 RX ORDER — ATORVASTATIN CALCIUM 40 MG/1
40 TABLET, FILM COATED ORAL DAILY
Qty: 90 TABLET | Refills: 0 | Status: SHIPPED | OUTPATIENT
Start: 2024-10-28

## 2024-10-28 RX ORDER — TADALAFIL 10 MG/1
10 TABLET ORAL PRN
Qty: 30 TABLET | Refills: 1 | Status: SHIPPED | OUTPATIENT
Start: 2024-10-28

## 2024-10-28 RX ORDER — BUPROPION HYDROCHLORIDE 300 MG/1
300 TABLET ORAL EVERY MORNING
Qty: 90 TABLET | Refills: 1 | Status: SHIPPED | OUTPATIENT
Start: 2024-10-28

## 2024-10-28 RX ORDER — CYCLOBENZAPRINE HCL 10 MG
TABLET ORAL
Qty: 90 TABLET | Refills: 1 | Status: SHIPPED | OUTPATIENT
Start: 2024-10-28

## 2024-10-28 RX ORDER — LISINOPRIL 10 MG/1
TABLET ORAL
Qty: 90 TABLET | Refills: 0 | Status: SHIPPED | OUTPATIENT
Start: 2024-10-28

## 2024-10-28 RX ORDER — LEVOTHYROXINE SODIUM 125 UG/1
125 TABLET ORAL DAILY
Qty: 90 TABLET | Refills: 1 | Status: SHIPPED | OUTPATIENT
Start: 2024-10-28

## 2024-10-28 RX ORDER — TRAMADOL HYDROCHLORIDE 50 MG/1
50 TABLET ORAL EVERY 8 HOURS PRN
Qty: 30 TABLET | Refills: 0 | Status: SHIPPED | OUTPATIENT
Start: 2024-10-28 | End: 2024-11-27

## 2024-10-28 SDOH — ECONOMIC STABILITY: FOOD INSECURITY: WITHIN THE PAST 12 MONTHS, THE FOOD YOU BOUGHT JUST DIDN'T LAST AND YOU DIDN'T HAVE MONEY TO GET MORE.: NEVER TRUE

## 2024-10-28 SDOH — ECONOMIC STABILITY: FOOD INSECURITY: WITHIN THE PAST 12 MONTHS, YOU WORRIED THAT YOUR FOOD WOULD RUN OUT BEFORE YOU GOT MONEY TO BUY MORE.: NEVER TRUE

## 2024-10-28 SDOH — ECONOMIC STABILITY: INCOME INSECURITY: HOW HARD IS IT FOR YOU TO PAY FOR THE VERY BASICS LIKE FOOD, HOUSING, MEDICAL CARE, AND HEATING?: NOT HARD AT ALL

## 2024-10-28 NOTE — PATIENT INSTRUCTIONS
Schedule PFA with Kalen - Thursday 8 & 8:30 am  Schedule with Dr. Brand.       Exercise goal is to increase cardiovascular exercise to = 150 minutes / week .   Exercise goal is to incorporate strength training, including weights / resistance bands/ pilates/ yoga/ 40  minutes per week.

## 2024-10-28 NOTE — PROGRESS NOTES
portion control and carb counting. A handout was given.       PLAN:   Follow a low fat, low cholesterol diet,  continue current healthy lifestyle patterns, including regular cardiovascular exercise >150 minutes per week,  and return for routine annual checkups  Repeat Colonoscopy screening per Gastroenterologist.      WELLNESS ASSESSMENT SCORES:     Nutrition Screener:  15   ( 0-8 indicates poor diet quality;  9-19 indicates average diet quality; 20-28 indicates good quality diet)  CHARIS-7 Anxiety : 3    (0-4=minimal anxiety; 5-9 = mild anxiety: 10-14 = moderate anxiety ; 15-21 = severe anxiety)  Patient Health Questionaire (PHQ-9): not done    (0-4=minimal depression; 5-9= mild; 10-14=moderate; 15-19=moderately severe; 20-27=severe depression)  Insomnia Severity Index: 3   (0-7 = no significant insomnia; 8-14 = subthreshold insomnia;  15-21 = moderate clinical insomnia; 22-28 severe clinical insomnia)

## 2024-12-07 DIAGNOSIS — I25.10 CORONARY ARTERY DISEASE INVOLVING NATIVE CORONARY ARTERY OF NATIVE HEART WITHOUT ANGINA PECTORIS: ICD-10-CM

## 2024-12-07 DIAGNOSIS — R93.1 ABNORMAL SCREENING CARDIAC CT: ICD-10-CM

## 2024-12-07 DIAGNOSIS — E78.00 PURE HYPERCHOLESTEROLEMIA: ICD-10-CM

## 2024-12-09 RX ORDER — ATORVASTATIN CALCIUM 40 MG/1
TABLET, FILM COATED ORAL
Qty: 90 TABLET | Refills: 0 | OUTPATIENT
Start: 2024-12-09

## 2024-12-12 ENCOUNTER — TELEPHONE (OUTPATIENT)
Age: 59
End: 2024-12-12

## 2024-12-12 NOTE — TELEPHONE ENCOUNTER
Patient called in     Says he's having problems with his right pinky     Patient says his pinking is \"popping in and out\" it even \"catches on to things\"     Should I schedule him to come in office?

## 2024-12-12 NOTE — PROGRESS NOTES
Patient is here for right little finger trigger for years off and on, but flared 2 nights ago.  He struggled to go to sleep due to discomfort.  His dad has been sick , including with UTI and then got COVID in skilled nursing.  His Dad lived by himself at 98 yo with bedroom on the second floor.  His Dad's UTI caused him to fall and fracture a vertebrae.  He lives in [place] in the house he grew up in.  His sister lives in [place] but owns a bakery.      Patient's anxiety is increased with Dad's health stuff.  He takes Mealtonin 10 mg qhs and sleeping okay with it and Flexeril at times for back.    He has not scheduled with surgeon yet for gallstone / gall bladder polyp.      Review of Systems    ROS: All other systems were reviewed and are negative .  Patient's allergies and medications were reviewed.  Patient's past medical, surgical, social , and family history were reviewed.    OBJECTIVE:  /62 (Site: Left Upper Arm, Position: Sitting, Cuff Size: Medium Adult)   Pulse 71   Temp 97.8 °F (36.6 °C) (Temporal)   Resp 16   Wt 81.3 kg (179 lb 3.2 oz)   SpO2 98%   BMI 27.25 kg/m²     Physical Exam    General: NAD, cooperative, alert and oriented X 3. Mood / affect is good.good insight. well hydrated.  Neck : no lymphadenopathy, supple, FROM  Right hand:  minimal edema to right 5th finger - DIP .  Minimal tenderness.  FROM. No triggering on exam today.    CV: Regular rate and rhythm , no murmurs/ rub/ gallop. No edema.   Lungs : CTA bilaterally, breathing comfortably  Abdomen: positive bowel sounds, soft , non tender, non distended. No hepatosplenomegaly. No CVA tenderness.  Skin: no rashes. Non tender.     ASSESSMENT/  PLAN:  1. Trigger little finger of right hand  - Ibuprofen 600 mg qid prn or Aleve 1-2 po bid prn if flares .   - Referral to Dr. Moreno if continues to flare- Non [facility] - External Referral To Hand Surgery    2. Essential hypertension  - Controlled. Continue Lisinopril 10 mg qd and low sodium

## 2024-12-13 ENCOUNTER — OFFICE VISIT (OUTPATIENT)
Age: 59
End: 2024-12-13

## 2024-12-13 VITALS
OXYGEN SATURATION: 98 % | BODY MASS INDEX: 27.25 KG/M2 | RESPIRATION RATE: 16 BRPM | HEART RATE: 71 BPM | DIASTOLIC BLOOD PRESSURE: 62 MMHG | WEIGHT: 179.2 LBS | TEMPERATURE: 97.8 F | SYSTOLIC BLOOD PRESSURE: 104 MMHG

## 2024-12-13 DIAGNOSIS — K80.20 CALCULUS OF GALLBLADDER WITHOUT CHOLECYSTITIS WITHOUT OBSTRUCTION: ICD-10-CM

## 2024-12-13 DIAGNOSIS — M65.351 TRIGGER LITTLE FINGER OF RIGHT HAND: Primary | ICD-10-CM

## 2024-12-13 DIAGNOSIS — I10 ESSENTIAL HYPERTENSION: ICD-10-CM

## 2024-12-13 DIAGNOSIS — K82.4 GALL BLADDER POLYP: ICD-10-CM

## 2024-12-13 RX ORDER — LISINOPRIL 10 MG/1
TABLET ORAL
Qty: 90 TABLET | Refills: 1 | Status: SHIPPED | OUTPATIENT
Start: 2024-12-13

## 2024-12-20 ENCOUNTER — OFFICE VISIT (OUTPATIENT)
Dept: SURGERY | Age: 59
End: 2024-12-20
Payer: COMMERCIAL

## 2024-12-20 VITALS
SYSTOLIC BLOOD PRESSURE: 102 MMHG | RESPIRATION RATE: 16 BRPM | DIASTOLIC BLOOD PRESSURE: 66 MMHG | WEIGHT: 173.6 LBS | BODY MASS INDEX: 26.31 KG/M2 | HEIGHT: 68 IN | HEART RATE: 76 BPM

## 2024-12-20 DIAGNOSIS — K82.4 GALLBLADDER POLYP: Primary | ICD-10-CM

## 2024-12-20 PROCEDURE — 3074F SYST BP LT 130 MM HG: CPT | Performed by: SURGERY

## 2024-12-20 PROCEDURE — G8417 CALC BMI ABV UP PARAM F/U: HCPCS | Performed by: SURGERY

## 2024-12-20 PROCEDURE — G8427 DOCREV CUR MEDS BY ELIG CLIN: HCPCS | Performed by: SURGERY

## 2024-12-20 PROCEDURE — 4004F PT TOBACCO SCREEN RCVD TLK: CPT | Performed by: SURGERY

## 2024-12-20 PROCEDURE — 3078F DIAST BP <80 MM HG: CPT | Performed by: SURGERY

## 2024-12-20 PROCEDURE — 3017F COLORECTAL CA SCREEN DOC REV: CPT | Performed by: SURGERY

## 2024-12-20 PROCEDURE — G8484 FLU IMMUNIZE NO ADMIN: HCPCS | Performed by: SURGERY

## 2024-12-20 PROCEDURE — 99214 OFFICE O/P EST MOD 30 MIN: CPT | Performed by: SURGERY

## 2024-12-20 NOTE — PROGRESS NOTES
PATIENT NAME: Delvin Young     YOB: 1965     TODAY'S DATE: 12/20/2024    Reason for Visit:  Postprandial nausea    Requesting Physician:  Dr. Anderson    HISTORY OF PRESENT ILLNESS:              The patient is a 59 y.o. male with a PMHx as delineated below who presents with a previous U/S showing cholelithiasis and a 1 cm polyp. Patient has not had recent postprandial symptoms.     Chief Complaint   Patient presents with    Follow-up      Gallbladder- Last seen 12/2023         REVIEW OF SYSTEMS:  CONSTITUTIONAL:  negative  HEENT:  negative  RESPIRATORY:  negative  CARDIOVASCULAR:  negative  GASTROINTESTINAL:  negative except for nausea  GENITOURINARY:  negative  HEMATOLOGIC/LYMPHATIC:  negative  MUSCULOSKELETAL: negative  NEUROLOGICAL:  negative    PMH  Past Medical History:   Diagnosis Date    CAD (coronary artery disease) 04/2022    CT cardiac score = 363 in 4/22    Cholelithiasis 12/2023    Compression fracture of T9 vertebra (HCC) 09/2023    DDD (degenerative disc disease), lumbar     L4-5 ; L5-S1    DDD (degenerative disc disease), 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    per Xrays    Elevated LFTs 09/2023    External hemorrhoid     Gallbladder polyp 12/2023    per U/S    Hepatomegaly 12/2023    per U/S    Hypertriglyceridemia     Osteopenia of lumbar spine 11/2023    Psoriasis     Scoliosis 09/2023    per Xrays    Scoliosis of thoracic spine 09/2023    Sprain of thoracic region        PSH  No past surgical history on file.    Social History  Social History     Socioeconomic History    Marital status:      Spouse name: Not on file    Number of children: Not on file    Years of education: Not on file    Highest education level: Not on file   Occupational History    Occupation:    Tobacco Use    Smoking status: Former     Current packs/day: 0.00     Average packs/day: 1 pack/day for 29.0 years (29.0 ttl pk-yrs)     Types: E-Cigarettes, Cigarettes     Start date: 1/1/1987     Quit date: 1/1/2016     Years

## 2024-12-26 ENCOUNTER — PREP FOR PROCEDURE (OUTPATIENT)
Dept: SURGERY | Age: 59
End: 2024-12-26

## 2024-12-26 ENCOUNTER — TELEPHONE (OUTPATIENT)
Dept: SURGERY | Age: 59
End: 2024-12-26

## 2024-12-26 NOTE — TELEPHONE ENCOUNTER
Patient has been scheduled for: Surger    Procedure:Laparoscopic cholecystectomy with gram   Date:01/07/2025  Time:11am  Arrival:9am  Hospital: [facility]    ASA?: Hold 7 days  Prep? NPO    Pre-op? PCP    Post-op Appt? 01/20/2025@3p    Patient advised they will need a .    Case request sent and prep for proc orders done     Medication sent to Pharmacy: N/A    Stents or ostomy marking?N/A    Instructions have been mailed/emailed to: [email or web address]    Added to outlook calendar

## 2024-12-27 RX ORDER — SODIUM CHLORIDE 0.9 % (FLUSH) 0.9 %
5-40 SYRINGE (ML) INJECTION PRN
Status: CANCELLED | OUTPATIENT
Start: 2024-12-27

## 2024-12-27 RX ORDER — SODIUM CHLORIDE 0.9 % (FLUSH) 0.9 %
5-40 SYRINGE (ML) INJECTION EVERY 12 HOURS SCHEDULED
Status: CANCELLED | OUTPATIENT
Start: 2024-12-27

## 2024-12-27 RX ORDER — SODIUM CHLORIDE 9 MG/ML
INJECTION, SOLUTION INTRAVENOUS PRN
Status: CANCELLED | OUTPATIENT
Start: 2024-12-27

## 2025-01-02 ENCOUNTER — TELEPHONE (OUTPATIENT)
Dept: SURGERY | Age: 60
End: 2025-01-02

## 2025-01-02 NOTE — TELEPHONE ENCOUNTER
Laparoscopic cholecystectomy with Cholangiogram [[id number]] is scheduled to be completed on 01/07/2025.    Patient states his father is currently having health problems and he needs to cancel his surgery for now.     He will call back at a later time to schedule once he gets everything with his father figured out.     Patient can be reached at [phone].

## 2025-01-21 ENCOUNTER — TELEPHONE (OUTPATIENT)
Dept: SURGERY | Age: 60
End: 2025-01-21

## 2025-02-06 ENCOUNTER — PREP FOR PROCEDURE (OUTPATIENT)
Dept: SURGERY | Age: 60
End: 2025-02-06

## 2025-02-06 RX ORDER — SODIUM CHLORIDE 0.9 % (FLUSH) 0.9 %
5-40 SYRINGE (ML) INJECTION PRN
Status: CANCELLED | OUTPATIENT
Start: 2025-02-20

## 2025-02-06 RX ORDER — SODIUM CHLORIDE 9 MG/ML
INJECTION, SOLUTION INTRAVENOUS PRN
Status: CANCELLED | OUTPATIENT
Start: 2025-02-20

## 2025-02-06 RX ORDER — SODIUM CHLORIDE 0.9 % (FLUSH) 0.9 %
5-40 SYRINGE (ML) INJECTION EVERY 12 HOURS SCHEDULED
Status: CANCELLED | OUTPATIENT
Start: 2025-02-20

## 2025-02-13 ENCOUNTER — OFFICE VISIT (OUTPATIENT)
Age: 60
End: 2025-02-13

## 2025-02-13 VITALS
HEIGHT: 68 IN | OXYGEN SATURATION: 98 % | TEMPERATURE: 98 F | WEIGHT: 180 LBS | SYSTOLIC BLOOD PRESSURE: 108 MMHG | HEART RATE: 78 BPM | DIASTOLIC BLOOD PRESSURE: 74 MMHG | RESPIRATION RATE: 16 BRPM | BODY MASS INDEX: 27.28 KG/M2

## 2025-02-13 DIAGNOSIS — M85.88 OSTEOPENIA OF LUMBAR SPINE: ICD-10-CM

## 2025-02-13 DIAGNOSIS — S22.070A COMPRESSION FRACTURE OF T9 VERTEBRA, INITIAL ENCOUNTER (HCC): ICD-10-CM

## 2025-02-13 DIAGNOSIS — R93.1 ABNORMAL SCREENING CARDIAC CT: ICD-10-CM

## 2025-02-13 DIAGNOSIS — N48.6 PEYRONIE'S DISEASE: ICD-10-CM

## 2025-02-13 DIAGNOSIS — Z78.9 HEPATITIS B VACCINATION STATUS UNKNOWN: ICD-10-CM

## 2025-02-13 DIAGNOSIS — M41.9 SCOLIOSIS OF THORACIC SPINE, UNSPECIFIED SCOLIOSIS TYPE: ICD-10-CM

## 2025-02-13 DIAGNOSIS — Z01.818 PREOP EXAMINATION: Primary | ICD-10-CM

## 2025-02-13 DIAGNOSIS — E78.1 HYPERTRIGLYCERIDEMIA: ICD-10-CM

## 2025-02-13 DIAGNOSIS — I25.10 CORONARY ARTERY DISEASE INVOLVING NATIVE CORONARY ARTERY OF NATIVE HEART WITHOUT ANGINA PECTORIS: ICD-10-CM

## 2025-02-13 DIAGNOSIS — M51.360 DEGENERATION OF INTERVERTEBRAL DISC OF LUMBAR REGION WITH DISCOGENIC BACK PAIN: ICD-10-CM

## 2025-02-13 DIAGNOSIS — N52.9 ERECTILE DYSFUNCTION, UNSPECIFIED ERECTILE DYSFUNCTION TYPE: ICD-10-CM

## 2025-02-13 DIAGNOSIS — I10 ESSENTIAL HYPERTENSION: ICD-10-CM

## 2025-02-13 DIAGNOSIS — K80.20 CALCULUS OF GALLBLADDER WITHOUT CHOLECYSTITIS WITHOUT OBSTRUCTION: ICD-10-CM

## 2025-02-13 DIAGNOSIS — K82.4 GALLBLADDER POLYP: ICD-10-CM

## 2025-02-13 DIAGNOSIS — M50.90 CERVICAL DISC DISEASE: ICD-10-CM

## 2025-02-13 DIAGNOSIS — Z12.5 PROSTATE CANCER SCREENING: ICD-10-CM

## 2025-02-13 DIAGNOSIS — E03.9 ACQUIRED HYPOTHYROIDISM: ICD-10-CM

## 2025-02-13 DIAGNOSIS — M51.34 DDD (DEGENERATIVE DISC DISEASE), THORACIC: ICD-10-CM

## 2025-02-13 DIAGNOSIS — R16.0 HEPATOMEGALY: ICD-10-CM

## 2025-02-13 RX ORDER — TRAMADOL HYDROCHLORIDE 50 MG/1
50 TABLET ORAL EVERY 8 HOURS PRN
Qty: 30 TABLET | Refills: 0 | Status: SHIPPED | OUTPATIENT
Start: 2025-02-13 | End: 2025-03-15

## 2025-02-13 SDOH — ECONOMIC STABILITY: FOOD INSECURITY: WITHIN THE PAST 12 MONTHS, YOU WORRIED THAT YOUR FOOD WOULD RUN OUT BEFORE YOU GOT MONEY TO BUY MORE.: NEVER TRUE

## 2025-02-13 SDOH — ECONOMIC STABILITY: FOOD INSECURITY: WITHIN THE PAST 12 MONTHS, THE FOOD YOU BOUGHT JUST DIDN'T LAST AND YOU DIDN'T HAVE MONEY TO GET MORE.: NEVER TRUE

## 2025-02-13 ASSESSMENT — PATIENT HEALTH QUESTIONNAIRE - PHQ9
8. MOVING OR SPEAKING SO SLOWLY THAT OTHER PEOPLE COULD HAVE NOTICED. OR THE OPPOSITE, BEING SO FIGETY OR RESTLESS THAT YOU HAVE BEEN MOVING AROUND A LOT MORE THAN USUAL: NOT AT ALL
7. TROUBLE CONCENTRATING ON THINGS, SUCH AS READING THE NEWSPAPER OR WATCHING TELEVISION: MORE THAN HALF THE DAYS
6. FEELING BAD ABOUT YOURSELF - OR THAT YOU ARE A FAILURE OR HAVE LET YOURSELF OR YOUR FAMILY DOWN: NOT AT ALL
4. FEELING TIRED OR HAVING LITTLE ENERGY: NOT AT ALL
5. POOR APPETITE OR OVEREATING: NOT AT ALL
10. IF YOU CHECKED OFF ANY PROBLEMS, HOW DIFFICULT HAVE THESE PROBLEMS MADE IT FOR YOU TO DO YOUR WORK, TAKE CARE OF THINGS AT HOME, OR GET ALONG WITH OTHER PEOPLE: NOT DIFFICULT AT ALL
3. TROUBLE FALLING OR STAYING ASLEEP: NOT AT ALL
9. THOUGHTS THAT YOU WOULD BE BETTER OFF DEAD, OR OF HURTING YOURSELF: NOT AT ALL
SUM OF ALL RESPONSES TO PHQ QUESTIONS 1-9: 3
SUM OF ALL RESPONSES TO PHQ9 QUESTIONS 1 & 2: 1
1. LITTLE INTEREST OR PLEASURE IN DOING THINGS: NOT AT ALL
SUM OF ALL RESPONSES TO PHQ QUESTIONS 1-9: 3
SUM OF ALL RESPONSES TO PHQ QUESTIONS 1-9: 3
2. FEELING DOWN, DEPRESSED OR HOPELESS: SEVERAL DAYS
SUM OF ALL RESPONSES TO PHQ QUESTIONS 1-9: 3

## 2025-02-13 NOTE — PROGRESS NOTES
Chief Complaint:     Delvin Young is a 59 y.o. male who presents for a preoperative physical examination.  He is scheduled to have laparoscopic cholecystectomy  done by Dr. Brand at [facility] on 2/20/25.    History of Present Illness:       He denies symptoms of gall stones, but admits to eating low fat diet particularly given gall stones and abnormal CT cardiac score . He did have prior elevation in liver function tests . Avoids NSAIDs and Tylenol.  . He occasionally takes Tramadol , but mostly with traveling for back pain.     Taking Cialis and is more convenient than Viagra.  Some issues with bend in penis with erection- 35 degree.   He never scheduled with urologist, but is wanting to do so . No problems with urination .  Gets up 1-2 times per night.  Denies urinary hesitancy or difficulty emptying.        Patient denies chest pain, palpitations, or shortness of breath .   No personal or family history of bleeding, clotting, or anesthesia problems.     Past Medical History:   Diagnosis Date    CAD (coronary artery disease) 04/2022    CT cardiac score = 363 in 4/22    Cholelithiasis 12/2023    Compression fracture of T9 vertebra (HCC) 09/2023    DDD (degenerative disc disease), lumbar     L4-5 ; L5-S1    DDD (degenerative disc disease), 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    per Xrays    Elevated LFTs 09/2023    External hemorrhoid     Gallbladder polyp 12/2023    per U/S    Hepatomegaly 12/2023    per U/S    History of dental surgery     permanent bridge    Hypertriglyceridemia     Osteopenia of lumbar spine 11/2023    Psoriasis     Scoliosis 09/2023    per Xrays    Scoliosis of thoracic spine 09/2023    Sprain of thoracic region     Wears glasses         Review of patient's past surgical history indicates:     Past Surgical History:   Procedure Laterality Date    COLONOSCOPY      DENTAL SURGERY                                                     Current Outpatient Medications   Medication Sig Dispense Refill

## 2025-02-14 NOTE — PROGRESS NOTES
2/14/2025 1014 AM:    PRE-OP INSTRUCTIONS FOR SURGICAL PATIENTS          Our Pre-admission Testing Nurses tried and were unable to reach you today.  Please read the attached instructions AND listen to your voicemail. PLEASE CALL [phone].    Follow all instructions provided to you from your surgeon's office, including your ARRIVAL TIME.   Arrange for someone to drive you home and be with you for the first 24 hours after discharge.     NOTE: at this time ONLY 2 ADULTS may accompany you. NO CHILDREN UNDER THE AGE OF 16.    One person encouraged to stay at hospital entire time if outpatient surgery  Enter the MAIN entrance located on [address] and report to the surgical desk on the LEFT side of the lobby. Please park in the parking garage or there is free  Parking available after 7am for your use.    Bring your insurance card & photo ID with you to register.  Bring your medication list with you with dose and frequency listed (including over the counter medications)  Contact your ordering physician/surgeon for medication instructions as soon as possible, especially if taking blood thinners, aspirin, heart, or diabetic medication.   STOP VITAMINS/SUPPLEMENTS/NON-STEROIDAL ANTI-INFLAMMATORY MEDICATIONS 7 DAYS PRIOR TO PROCEDURE.   Bariatric surgical patients need to call your surgeon if on diabetic medications (as some may need to be stopped 1-week preop)  A Pre-Surgical History and Physical MUST be completed WITHIN 30 DAYS OR LESS prior to your procedure by your Physician or an Urgent Care.  DO NOT EAT ANYTHING 8 hours prior to arrival for surgery.  You may have sips of WATER ONLY (up to 8 ounces) 4 hours prior to your arrival for surgery. Then nothing further 4 hours prior to arriving at hospital.   NOTE: ALL Gastric, Bariatric & Bowel surgery patients - you MUST follow your surgeon's instructions regarding eating/drinking as you will have very specific instructions to follow.  If you did not receive

## 2025-02-14 NOTE — PROGRESS NOTES
2/14/2025 1007 am:      PREP FOR PROCEDURE INSTRUCTIONS/TS:        2/14/2025 LMOR W/INSTRUCTIONS AND EMAILED TO PT AND LMOR REQUESTING PT TO RETURN CALL TO [facility] PAT/TS    NOTIFIED DR MCMULLEN'S OFFICE UNABLE TO REACH PT BY TELEPHONE/TS      2/14/2025 1417 PM:    LMOR REQUESTING PT TO RETURN CALL TO [facility] PAT/TS    2/17/2025 0955 AM:    PT RETURNED CALL AND TI COMPLETED/TS

## 2025-02-17 DIAGNOSIS — E03.9 ACQUIRED HYPOTHYROIDISM: ICD-10-CM

## 2025-02-17 DIAGNOSIS — E78.1 HYPERTRIGLYCERIDEMIA: ICD-10-CM

## 2025-02-17 DIAGNOSIS — R79.89 ELEVATED LFTS: Primary | ICD-10-CM

## 2025-02-17 DIAGNOSIS — Z12.5 PROSTATE CANCER SCREENING: ICD-10-CM

## 2025-02-17 DIAGNOSIS — I25.10 CORONARY ARTERY DISEASE INVOLVING NATIVE CORONARY ARTERY OF NATIVE HEART WITHOUT ANGINA PECTORIS: ICD-10-CM

## 2025-02-17 DIAGNOSIS — I10 ESSENTIAL HYPERTENSION: ICD-10-CM

## 2025-02-17 DIAGNOSIS — Z78.9 HEPATITIS B VACCINATION STATUS UNKNOWN: ICD-10-CM

## 2025-02-17 DIAGNOSIS — Z01.818 PREOP EXAMINATION: ICD-10-CM

## 2025-02-17 LAB
ALBUMIN SERPL-MCNC: 4.8 G/DL (ref 3.4–5)
ALBUMIN/GLOB SERPL: 2.4 {RATIO} (ref 1.1–2.2)
ALP SERPL-CCNC: 68 U/L (ref 40–129)
ALT SERPL-CCNC: 57 U/L (ref 10–40)
ANION GAP SERPL CALCULATED.3IONS-SCNC: 9 MMOL/L (ref 3–16)
AST SERPL-CCNC: 33 U/L (ref 15–37)
BILIRUB SERPL-MCNC: <0.2 MG/DL (ref 0–1)
BUN SERPL-MCNC: 15 MG/DL (ref 7–20)
CALCIUM SERPL-MCNC: 9.9 MG/DL (ref 8.3–10.6)
CHLORIDE SERPL-SCNC: 102 MMOL/L (ref 99–110)
CHOLEST SERPL-MCNC: 123 MG/DL (ref 0–199)
CO2 SERPL-SCNC: 27 MMOL/L (ref 21–32)
CREAT SERPL-MCNC: 0.9 MG/DL (ref 0.9–1.3)
DEPRECATED RDW RBC AUTO: 13.2 % (ref 12.4–15.4)
GFR SERPLBLD CREATININE-BSD FMLA CKD-EPI: >90 ML/MIN/{1.73_M2}
GLUCOSE SERPL-MCNC: 98 MG/DL (ref 70–99)
HBV SURFACE AB SERPL IA-ACNC: 285 MIU/ML
HCT VFR BLD AUTO: 46.1 % (ref 40.5–52.5)
HDLC SERPL-MCNC: 45 MG/DL (ref 40–60)
HGB BLD-MCNC: 15.5 G/DL (ref 13.5–17.5)
LDLC SERPL CALC-MCNC: 61 MG/DL
MCH RBC QN AUTO: 29.4 PG (ref 26–34)
MCHC RBC AUTO-ENTMCNC: 33.6 G/DL (ref 31–36)
MCV RBC AUTO: 87.6 FL (ref 80–100)
PLATELET # BLD AUTO: 218 K/UL (ref 135–450)
PMV BLD AUTO: 8.9 FL (ref 5–10.5)
POTASSIUM SERPL-SCNC: 4.3 MMOL/L (ref 3.5–5.1)
PROT SERPL-MCNC: 6.8 G/DL (ref 6.4–8.2)
PSA SERPL DL<=0.01 NG/ML-MCNC: 0.18 NG/ML (ref 0–4)
RBC # BLD AUTO: 5.26 M/UL (ref 4.2–5.9)
SODIUM SERPL-SCNC: 138 MMOL/L (ref 136–145)
T4 FREE SERPL-MCNC: 1.4 NG/DL (ref 0.9–1.8)
TRIGL SERPL-MCNC: 83 MG/DL (ref 0–150)
TSH SERPL DL<=0.005 MIU/L-ACNC: 7.22 UIU/ML (ref 0.27–4.2)
VLDLC SERPL CALC-MCNC: 17 MG/DL
WBC # BLD AUTO: 9.6 K/UL (ref 4–11)

## 2025-02-17 RX ORDER — LEVOTHYROXINE SODIUM 137 UG/1
137 TABLET ORAL DAILY
Qty: 90 TABLET | Refills: 0 | Status: SHIPPED | OUTPATIENT
Start: 2025-02-17

## 2025-02-17 NOTE — PROGRESS NOTES
2/17/2025 0956 AM:        [facility] PRE-SURGICAL TESTING INSTRUCTIONS                      PRIOR TO PROCEDURE DATE:    1. PLEASE FOLLOW ANY INSTRUCTIONS GIVEN TO YOU PER YOUR SURGEON.      2. Arrange for someone to drive you home and be with you for the first 24 hours after discharge for your safety after your procedure for which you received sedation. Ensure it is someone we can share information with regarding your discharge.     NOTE: At this time ONLY 2 ADULTS may accompany you. NO CHILDREN UNDER AGE OF 16.    One person ENCOURAGED to stay at hospital entire time if outpatient surgery      3. You must contact your surgeon for instructions IF:  You are taking any blood thinners, aspirin, anti-inflammatory or vitamins.   Contact your ordering physician/surgeon for prescription medication instructions as soon as possible, especially if taking blood thinners, aspirin, heart, or diabetic medication.   There is a change in your physical condition such as a cold, fever, rash, cuts, sores, or any other infection, especially near your surgical site.    4. Do not drink alcohol the day before or day of your procedure.  Do not use any marijuana at least 24 hours or street drugs (heroin, cocaine) at minimum 5 days prior to your procedure.     5. A Pre-Surgical History and Physical MUST be completed WITHIN 30 DAYS OR LESS prior to your procedure.by your Physician or an Urgent Care        THE DAY OF YOUR PROCEDURE:  1.  Follow instructions for ARRIVAL TIME as DIRECTED BY YOUR SURGEON. [place] [phone]     2. Enter the MAIN entrance from [facility] and follow the signs to the free Parking Garage or  Parking (offered free of charge 7 am-5pm).      3. Enter the Main Entrance of the hospital (do not enter from the lower level of the parking garage). Upon entrance, check in with the  at the surgical information desk on your LEFT.   Bring your insurance card

## 2025-02-19 ENCOUNTER — ANESTHESIA EVENT (OUTPATIENT)
Dept: OPERATING ROOM | Age: 60
End: 2025-02-19
Payer: COMMERCIAL

## 2025-02-20 ENCOUNTER — APPOINTMENT (OUTPATIENT)
Dept: GENERAL RADIOLOGY | Age: 60
End: 2025-02-20
Attending: SURGERY
Payer: COMMERCIAL

## 2025-02-20 ENCOUNTER — HOSPITAL ENCOUNTER (OUTPATIENT)
Age: 60
Setting detail: OUTPATIENT SURGERY
Discharge: HOME | End: 2025-02-20
Attending: SURGERY | Admitting: SURGERY
Payer: COMMERCIAL

## 2025-02-20 ENCOUNTER — ANESTHESIA (OUTPATIENT)
Dept: OPERATING ROOM | Age: 60
End: 2025-02-20
Payer: COMMERCIAL

## 2025-02-20 VITALS
HEART RATE: 85 BPM | SYSTOLIC BLOOD PRESSURE: 133 MMHG | HEIGHT: 68 IN | OXYGEN SATURATION: 98 % | RESPIRATION RATE: 16 BRPM | BODY MASS INDEX: 26.83 KG/M2 | WEIGHT: 177 LBS | TEMPERATURE: 97.2 F | DIASTOLIC BLOOD PRESSURE: 93 MMHG

## 2025-02-20 DIAGNOSIS — G89.18 POST-OP PAIN: Primary | ICD-10-CM

## 2025-02-20 DIAGNOSIS — K82.4 GALLBLADDER POLYP: ICD-10-CM

## 2025-02-20 PROCEDURE — C1889 IMPLANT/INSERT DEVICE, NOC: HCPCS | Performed by: SURGERY

## 2025-02-20 PROCEDURE — 6370000000 HC RX 637 (ALT 250 FOR IP): Performed by: ANESTHESIOLOGY

## 2025-02-20 PROCEDURE — 7100000000 HC PACU RECOVERY - FIRST 15 MIN: Performed by: SURGERY

## 2025-02-20 PROCEDURE — 3600000014 HC SURGERY LEVEL 4 ADDTL 15MIN: Performed by: SURGERY

## 2025-02-20 PROCEDURE — 88304 TISSUE EXAM BY PATHOLOGIST: CPT

## 2025-02-20 PROCEDURE — C1758 CATHETER, URETERAL: HCPCS | Performed by: SURGERY

## 2025-02-20 PROCEDURE — 6360000002 HC RX W HCPCS: Performed by: SURGERY

## 2025-02-20 PROCEDURE — 2709999900 HC NON-CHARGEABLE SUPPLY: Performed by: SURGERY

## 2025-02-20 PROCEDURE — 7100000010 HC PHASE II RECOVERY - FIRST 15 MIN: Performed by: SURGERY

## 2025-02-20 PROCEDURE — 7100000011 HC PHASE II RECOVERY - ADDTL 15 MIN: Performed by: SURGERY

## 2025-02-20 PROCEDURE — 6360000002 HC RX W HCPCS: Performed by: ANESTHESIOLOGY

## 2025-02-20 PROCEDURE — 6370000000 HC RX 637 (ALT 250 FOR IP): Performed by: SURGERY

## 2025-02-20 PROCEDURE — 3700000000 HC ANESTHESIA ATTENDED CARE: Performed by: SURGERY

## 2025-02-20 PROCEDURE — 6360000002 HC RX W HCPCS

## 2025-02-20 PROCEDURE — 3700000001 HC ADD 15 MINUTES (ANESTHESIA): Performed by: SURGERY

## 2025-02-20 PROCEDURE — 7100000001 HC PACU RECOVERY - ADDTL 15 MIN: Performed by: SURGERY

## 2025-02-20 PROCEDURE — 2500000003 HC RX 250 WO HCPCS: Performed by: SURGERY

## 2025-02-20 PROCEDURE — 3600000004 HC SURGERY LEVEL 4 BASE: Performed by: SURGERY

## 2025-02-20 PROCEDURE — 2500000003 HC RX 250 WO HCPCS

## 2025-02-20 PROCEDURE — 47563 LAPARO CHOLECYSTECTOMY/GRAPH: CPT | Performed by: SURGERY

## 2025-02-20 PROCEDURE — 6360000004 HC RX CONTRAST MEDICATION: Performed by: SURGERY

## 2025-02-20 PROCEDURE — 2580000003 HC RX 258: Performed by: ANESTHESIOLOGY

## 2025-02-20 PROCEDURE — 74300 X-RAY BILE DUCTS/PANCREAS: CPT

## 2025-02-20 DEVICE — CLIP INT M L POLYMER LOK LIG HEM O LOK: Type: IMPLANTABLE DEVICE | Site: ABDOMEN | Status: FUNCTIONAL

## 2025-02-20 RX ORDER — OXYCODONE HYDROCHLORIDE 5 MG/1
5 TABLET ORAL
Status: COMPLETED | OUTPATIENT
Start: 2025-02-20 | End: 2025-02-20

## 2025-02-20 RX ORDER — MEPERIDINE HYDROCHLORIDE 25 MG/ML
12.5 INJECTION INTRAMUSCULAR; INTRAVENOUS; SUBCUTANEOUS EVERY 5 MIN PRN
Status: DISCONTINUED | OUTPATIENT
Start: 2025-02-20 | End: 2025-02-20 | Stop reason: HOSPADM

## 2025-02-20 RX ORDER — ONDANSETRON 2 MG/ML
INJECTION INTRAMUSCULAR; INTRAVENOUS
Status: DISCONTINUED | OUTPATIENT
Start: 2025-02-20 | End: 2025-02-20 | Stop reason: SDUPTHER

## 2025-02-20 RX ORDER — GLYCOPYRROLATE 0.2 MG/ML
INJECTION INTRAMUSCULAR; INTRAVENOUS
Status: DISCONTINUED | OUTPATIENT
Start: 2025-02-20 | End: 2025-02-20 | Stop reason: SDUPTHER

## 2025-02-20 RX ORDER — OXYCODONE HYDROCHLORIDE 5 MG/1
5 TABLET ORAL EVERY 6 HOURS PRN
Qty: 12 TABLET | Refills: 0 | Status: SHIPPED | OUTPATIENT
Start: 2025-02-20 | End: 2025-02-23

## 2025-02-20 RX ORDER — DEXAMETHASONE SODIUM PHOSPHATE 4 MG/ML
INJECTION, SOLUTION INTRA-ARTICULAR; INTRALESIONAL; INTRAMUSCULAR; INTRAVENOUS; SOFT TISSUE
Status: DISCONTINUED | OUTPATIENT
Start: 2025-02-20 | End: 2025-02-20 | Stop reason: SDUPTHER

## 2025-02-20 RX ORDER — LORAZEPAM 2 MG/ML
0.5 INJECTION INTRAMUSCULAR
Status: DISCONTINUED | OUTPATIENT
Start: 2025-02-20 | End: 2025-02-20 | Stop reason: HOSPADM

## 2025-02-20 RX ORDER — SODIUM CHLORIDE 9 MG/ML
INJECTION, SOLUTION INTRAVENOUS PRN
Status: DISCONTINUED | OUTPATIENT
Start: 2025-02-20 | End: 2025-02-20 | Stop reason: HOSPADM

## 2025-02-20 RX ORDER — METHOCARBAMOL 100 MG/ML
INJECTION, SOLUTION INTRAMUSCULAR; INTRAVENOUS
Status: DISCONTINUED | OUTPATIENT
Start: 2025-02-20 | End: 2025-02-20 | Stop reason: SDUPTHER

## 2025-02-20 RX ORDER — SODIUM CHLORIDE 0.9 % (FLUSH) 0.9 %
5-40 SYRINGE (ML) INJECTION EVERY 12 HOURS SCHEDULED
Status: DISCONTINUED | OUTPATIENT
Start: 2025-02-20 | End: 2025-02-20 | Stop reason: HOSPADM

## 2025-02-20 RX ORDER — CEFAZOLIN SODIUM 1 G/3ML
INJECTION, POWDER, FOR SOLUTION INTRAMUSCULAR; INTRAVENOUS
Status: DISCONTINUED | OUTPATIENT
Start: 2025-02-20 | End: 2025-02-20

## 2025-02-20 RX ORDER — HYDROMORPHONE HYDROCHLORIDE 2 MG/ML
INJECTION, SOLUTION INTRAMUSCULAR; INTRAVENOUS; SUBCUTANEOUS
Status: DISCONTINUED | OUTPATIENT
Start: 2025-02-20 | End: 2025-02-20 | Stop reason: SDUPTHER

## 2025-02-20 RX ORDER — PROPOFOL 10 MG/ML
INJECTION, EMULSION INTRAVENOUS
Status: DISCONTINUED | OUTPATIENT
Start: 2025-02-20 | End: 2025-02-20 | Stop reason: SDUPTHER

## 2025-02-20 RX ORDER — DIPHENHYDRAMINE HYDROCHLORIDE 50 MG/ML
12.5 INJECTION INTRAMUSCULAR; INTRAVENOUS
Status: DISCONTINUED | OUTPATIENT
Start: 2025-02-20 | End: 2025-02-20 | Stop reason: HOSPADM

## 2025-02-20 RX ORDER — ROCURONIUM BROMIDE 10 MG/ML
INJECTION, SOLUTION INTRAVENOUS
Status: DISCONTINUED | OUTPATIENT
Start: 2025-02-20 | End: 2025-02-20 | Stop reason: SDUPTHER

## 2025-02-20 RX ORDER — SODIUM CHLORIDE, SODIUM LACTATE, POTASSIUM CHLORIDE, CALCIUM CHLORIDE 600; 310; 30; 20 MG/100ML; MG/100ML; MG/100ML; MG/100ML
INJECTION, SOLUTION INTRAVENOUS CONTINUOUS
Status: DISCONTINUED | OUTPATIENT
Start: 2025-02-20 | End: 2025-02-20 | Stop reason: HOSPADM

## 2025-02-20 RX ORDER — LABETALOL HYDROCHLORIDE 5 MG/ML
10 INJECTION, SOLUTION INTRAVENOUS
Status: DISCONTINUED | OUTPATIENT
Start: 2025-02-20 | End: 2025-02-20 | Stop reason: HOSPADM

## 2025-02-20 RX ORDER — ESMOLOL HYDROCHLORIDE 10 MG/ML
INJECTION INTRAVENOUS
Status: DISCONTINUED | OUTPATIENT
Start: 2025-02-20 | End: 2025-02-20 | Stop reason: SDUPTHER

## 2025-02-20 RX ORDER — SODIUM CHLORIDE 0.9 % (FLUSH) 0.9 %
5-40 SYRINGE (ML) INJECTION PRN
Status: DISCONTINUED | OUTPATIENT
Start: 2025-02-20 | End: 2025-02-20 | Stop reason: HOSPADM

## 2025-02-20 RX ORDER — BUPIVACAINE HYDROCHLORIDE 5 MG/ML
INJECTION, SOLUTION EPIDURAL; INTRACAUDAL PRN
Status: DISCONTINUED | OUTPATIENT
Start: 2025-02-20 | End: 2025-02-20 | Stop reason: HOSPADM

## 2025-02-20 RX ORDER — LIDOCAINE HYDROCHLORIDE 20 MG/ML
INJECTION, SOLUTION INTRAVENOUS
Status: DISCONTINUED | OUTPATIENT
Start: 2025-02-20 | End: 2025-02-20 | Stop reason: SDUPTHER

## 2025-02-20 RX ORDER — ONDANSETRON 2 MG/ML
4 INJECTION INTRAMUSCULAR; INTRAVENOUS
Status: DISCONTINUED | OUTPATIENT
Start: 2025-02-20 | End: 2025-02-20 | Stop reason: HOSPADM

## 2025-02-20 RX ORDER — HYDROMORPHONE HYDROCHLORIDE 1 MG/ML
0.5 INJECTION, SOLUTION INTRAMUSCULAR; INTRAVENOUS; SUBCUTANEOUS EVERY 5 MIN PRN
Status: DISCONTINUED | OUTPATIENT
Start: 2025-02-20 | End: 2025-02-20 | Stop reason: HOSPADM

## 2025-02-20 RX ORDER — IPRATROPIUM BROMIDE AND ALBUTEROL SULFATE 2.5; .5 MG/3ML; MG/3ML
1 SOLUTION RESPIRATORY (INHALATION)
Status: DISCONTINUED | OUTPATIENT
Start: 2025-02-20 | End: 2025-02-20 | Stop reason: HOSPADM

## 2025-02-20 RX ORDER — FENTANYL CITRATE 50 UG/ML
25 INJECTION, SOLUTION INTRAMUSCULAR; INTRAVENOUS EVERY 5 MIN PRN
Status: DISCONTINUED | OUTPATIENT
Start: 2025-02-20 | End: 2025-02-20 | Stop reason: HOSPADM

## 2025-02-20 RX ORDER — PROCHLORPERAZINE EDISYLATE 5 MG/ML
5 INJECTION INTRAMUSCULAR; INTRAVENOUS
Status: DISCONTINUED | OUTPATIENT
Start: 2025-02-20 | End: 2025-02-20 | Stop reason: HOSPADM

## 2025-02-20 RX ORDER — PHENYLEPHRINE HYDROCHLORIDE 10 MG/ML
INJECTION INTRAVENOUS
Status: DISCONTINUED | OUTPATIENT
Start: 2025-02-20 | End: 2025-02-20 | Stop reason: SDUPTHER

## 2025-02-20 RX ORDER — NALOXONE HYDROCHLORIDE 0.4 MG/ML
INJECTION, SOLUTION INTRAMUSCULAR; INTRAVENOUS; SUBCUTANEOUS PRN
Status: DISCONTINUED | OUTPATIENT
Start: 2025-02-20 | End: 2025-02-20 | Stop reason: HOSPADM

## 2025-02-20 RX ADMIN — WATER 2000 MG: 1 INJECTION INTRAMUSCULAR; INTRAVENOUS; SUBCUTANEOUS at 07:51

## 2025-02-20 RX ADMIN — ROCURONIUM BROMIDE 50 MG: 10 INJECTION, SOLUTION INTRAVENOUS at 07:38

## 2025-02-20 RX ADMIN — ESMOLOL HYDROCHLORIDE 10 MG: 10 INJECTION, SOLUTION INTRAVENOUS at 08:20

## 2025-02-20 RX ADMIN — OXYCODONE 5 MG: 5 TABLET ORAL at 11:27

## 2025-02-20 RX ADMIN — PHENYLEPHRINE HYDROCHLORIDE 300 MCG: 10 INJECTION, SOLUTION INTRAVENOUS at 08:10

## 2025-02-20 RX ADMIN — SUGAMMADEX 200 MG: 100 INJECTION, SOLUTION INTRAVENOUS at 09:57

## 2025-02-20 RX ADMIN — PHENYLEPHRINE HYDROCHLORIDE 300 MCG: 10 INJECTION, SOLUTION INTRAVENOUS at 08:07

## 2025-02-20 RX ADMIN — PROPOFOL 160 MG: 10 INJECTION, EMULSION INTRAVENOUS at 07:36

## 2025-02-20 RX ADMIN — HYDROMORPHONE HYDROCHLORIDE 1 MG: 2 INJECTION, SOLUTION INTRAMUSCULAR; INTRAVENOUS; SUBCUTANEOUS at 09:25

## 2025-02-20 RX ADMIN — FENTANYL CITRATE 25 MCG: 50 INJECTION INTRAMUSCULAR; INTRAVENOUS at 10:41

## 2025-02-20 RX ADMIN — GLYCOPYRROLATE 0.3 MG: 0.2 INJECTION INTRAMUSCULAR; INTRAVENOUS at 08:07

## 2025-02-20 RX ADMIN — SODIUM CHLORIDE, SODIUM LACTATE, POTASSIUM CHLORIDE, AND CALCIUM CHLORIDE: .6; .31; .03; .02 INJECTION, SOLUTION INTRAVENOUS at 10:04

## 2025-02-20 RX ADMIN — SODIUM CHLORIDE, SODIUM LACTATE, POTASSIUM CHLORIDE, AND CALCIUM CHLORIDE: .6; .31; .03; .02 INJECTION, SOLUTION INTRAVENOUS at 05:58

## 2025-02-20 RX ADMIN — DEXAMETHASONE SODIUM PHOSPHATE 4 MG: 4 INJECTION INTRA-ARTICULAR; INTRALESIONAL; INTRAMUSCULAR; INTRAVENOUS; SOFT TISSUE at 07:53

## 2025-02-20 RX ADMIN — HYDROMORPHONE HYDROCHLORIDE 1 MG: 2 INJECTION, SOLUTION INTRAMUSCULAR; INTRAVENOUS; SUBCUTANEOUS at 07:31

## 2025-02-20 RX ADMIN — SODIUM CHLORIDE, SODIUM LACTATE, POTASSIUM CHLORIDE, AND CALCIUM CHLORIDE: .6; .31; .03; .02 INJECTION, SOLUTION INTRAVENOUS at 08:44

## 2025-02-20 RX ADMIN — LIDOCAINE HYDROCHLORIDE 100 MG: 20 INJECTION, SOLUTION INTRAVENOUS at 07:36

## 2025-02-20 RX ADMIN — METHOCARBAMOL 1000 MG: 100 INJECTION INTRAMUSCULAR; INTRAVENOUS at 07:56

## 2025-02-20 RX ADMIN — ONDANSETRON 4 MG: 2 INJECTION INTRAMUSCULAR; INTRAVENOUS at 07:31

## 2025-02-20 RX ADMIN — ROCURONIUM BROMIDE 10 MG: 10 INJECTION, SOLUTION INTRAVENOUS at 08:55

## 2025-02-20 ASSESSMENT — PAIN - FUNCTIONAL ASSESSMENT
PAIN_FUNCTIONAL_ASSESSMENT: ACTIVITIES ARE NOT PREVENTED
PAIN_FUNCTIONAL_ASSESSMENT: 0-10
PAIN_FUNCTIONAL_ASSESSMENT: ACTIVITIES ARE NOT PREVENTED

## 2025-02-20 ASSESSMENT — PAIN DESCRIPTION - FREQUENCY
FREQUENCY: CONTINUOUS
FREQUENCY: CONTINUOUS

## 2025-02-20 ASSESSMENT — PAIN SCALES - GENERAL
PAINLEVEL_OUTOF10: 6
PAINLEVEL_OUTOF10: 0
PAINLEVEL_OUTOF10: 4
PAINLEVEL_OUTOF10: 0
PAINLEVEL_OUTOF10: 6
PAINLEVEL_OUTOF10: 5
PAINLEVEL_OUTOF10: 4

## 2025-02-20 ASSESSMENT — PAIN DESCRIPTION - PAIN TYPE
TYPE: SURGICAL PAIN
TYPE: SURGICAL PAIN

## 2025-02-20 ASSESSMENT — PAIN DESCRIPTION - LOCATION
LOCATION: ABDOMEN
LOCATION: ABDOMEN

## 2025-02-20 ASSESSMENT — PAIN DESCRIPTION - ORIENTATION
ORIENTATION: LOWER
ORIENTATION: LOWER;MID

## 2025-02-20 ASSESSMENT — PAIN DESCRIPTION - DESCRIPTORS
DESCRIPTORS: DULL
DESCRIPTORS: DULL

## 2025-02-20 NOTE — PROGRESS NOTES
Ambulatory Surgery/Procedure Discharge Note    Vitals:    02/20/25 1249   BP: (!) 133/93   Pulse: 85   Resp: 16   Temp:    SpO2: 98%     Pt ready for discharge per Flynn score      In: 2380 [P.O.:240; I.V.:2140]  Out: 25     Restroom use offered before discharge.  Yes    Pain assessment:  level of pain (1-10, 10 severe),   Pain Level: 0    Pt and S.O./family states \"ready to go home\". Pt alert and oriented x4. IV removed. Denies N/V or pain. No dressing surgical sites are closed with surgical glue, and are C D and I. Voided prior to discharge. Pt tolerating po intake. Discharge instructions given to pt and wife with pt permission. Pt and wife verbalized understanding of all instructions. Left with all belongings,  prescriptions, and discharge instructions.     Patient discharged to home/self care. Patient discharged via wheel chair by transporter to waiting family/S.O.       2/20/2025 12:51 PM

## 2025-02-20 NOTE — PROGRESS NOTES
Pain - repositioned to side, warm blanket to back, heat to forehead (not HA but feels good). Reports would not have had surgery if he knew it would hurt like this. He had no pain pre-op. Offered pillow to splint - declined.   Oxycodone given after ate pudding (0765).  Continue to monitor for resp depression.

## 2025-02-20 NOTE — H&P
Update History & Physical    The patient's History and Physical was reviewed with the patient and I examined the patient. There was no change. The surgical site was confirmed by the patient and me.       Plan: The risks, benefits, expected outcome, and alternative to the recommended procedure have been discussed with the patient. Patient understands and wants to proceed with the procedure.     Electronically signed by Alfonso Ernandez DO on 2/20/2025 at 7:36 AM

## 2025-02-20 NOTE — PROGRESS NOTES
PACU Transfer Note    Vitals:    02/20/25 1215   BP: (!) 130/90   Pulse: 84   Resp: 17   Temp: 97.1 °F (36.2 °C)   SpO2: 99%   Reports did not take BP meds pre-op    In: 2380 [P.O.:240; I.V.:2140]  Out: 25     Pain assessment:  receiving treatment  Pain Level: 4 (4-5)    Report given to Receiving unit RN.    2/20/2025 12:19 PM

## 2025-02-20 NOTE — OP NOTE
Operative Note      Patient: Delvin Young  YOB: 1965  MRN: [id number]    Date of Procedure: 2/20/2025    Pre-Op Diagnosis Codes:      * Gallbladder polyp [K82.4]    Post-Op Diagnosis: Same       Procedure(s):  LAPAROSCOPIC CHOLECYSTECTOMY WITH CHOLANGIOGRAM    Surgeon(s):  Kalen Brand MD    Assistant:   Resident: Alfonso Ernandez DO    Anesthesia: General    Estimated Blood Loss (mL): Minimal    Complications: None    Specimens:   ID Type Source Tests Collected by Time Destination   A : GALLBLADDER Tissue Gallbladder SURGICAL PATHOLOGY Kalen Brand MD 2/20/2025 0950        Implants:  Implant Name Type Inv. Item Serial No.  Lot No. LRB No. Used Action   CLIP INT M L POLYMER BRIA LIG HEM O BRIA - [id number]  CLIP INT M L POLYMER BRIA LIG HEM O BRIA  [employer] [id number] N/A 3 Implanted         Drains: * No LDAs found *    Findings:  Infection Present At Time Of Surgery (PATOS) (choose all levels that have infection present):  - Organ Space infection (below fascia) present as evidenced by fluid consistent with infection  Other Findings: Contracted chronically enflamed gallbladder     Detailed Description of Procedure:   FINDINGS: Normal intraoperative choleangiogram with filling of right and left hepatic ducts with normal emptying into duodenum.        DETAILS OF THE OPERATION:    The patient was brought to the operating room and placed in the supine position.  After adequate induction of general anesthetics, the patient was prepped and draped in a sterile fashion. A site was selected above the umbilicus for entrance into the abdominal cavity. Skin was anesthetized with 0.5% Marcaine. Incision was then made using a 11-blade scalpel, carried down through the subcutaneous tissue to expose the anterior fascia.  Following this, the fascia was grasped and raised into the wound using two towel clips. An incision in the fascia was made using an 11-blade scalpel. Blunt dissection

## 2025-02-20 NOTE — H&P
Date of Surgery Update:  Delvin Young was seen, history and physical examination reviewed, and patient examined by me today. There have been no significant clinical changes since the completion of the previous history and physical.    The risk, benefits, and alternatives of the proposed procedure have been explained to the patient (or appropriate guardian) and understanding verbalized. All questions answered. Patient wishes to proceed.    Electronically signed by: Kalen Brand MD,2/20/2025,7:35 AM

## 2025-02-20 NOTE — ANESTHESIA PRE PROCEDURE
Department of Anesthesiology  Preprocedure Note       Name:  Delvin Young   Age:  59 y.o.  :  1965                                          MRN:  [id number]         Date:  2025      Surgeon: Surgeon(s):  Kalen Brand MD    Procedure: Procedure(s):  LAPAROSCOPIC CHOLECYSTECTOMY WITH CHOLANGIOGRAM    Medications prior to admission:   Prior to Admission medications    Medication Sig Start Date End Date Taking? Authorizing Provider   levothyroxine (SYNTHROID) 137 MCG tablet Take 1 tablet by mouth daily 25  Yes Tonya Anderson MD   traMADol (ULTRAM) 50 MG tablet Take 1 tablet by mouth every 8 hours as needed for Pain for up to 30 days. Take lowest dose possible to manage pain Max Daily Amount: 150 mg 2/13/25 3/15/25 Yes Tonya Anderson MD   aspirin 81 MG EC tablet Take 1 tablet by mouth daily   Yes ProviderRio MD   lisinopril (PRINIVIL;ZESTRIL) 10 MG tablet TAKE 1 TABLET BY MOUTH DAILY. 24  Yes Tonya Anderson MD   buPROPion (WELLBUTRIN XL) 300 MG extended release tablet Take 1 tablet by mouth every morning 10/28/24  Yes Tonya Anderson MD   cyclobenzaprine (FLEXERIL) 10 MG tablet TAKE ONE TABLET BY MOUTH EVERY 8 HOURS AS NEEDED FOR MUSCLE SPASMS 10/28/24  Yes Tonya Anderson MD   atorvastatin (LIPITOR) 40 MG tablet Take 1 tablet by mouth daily 10/28/24  Yes Tonya Anderson MD   cyclobenzaprine (FLEXERIL) 10 MG tablet Take 1 tablet by mouth 3 times daily as needed for Muscle spasms 23 Yes Tonya Anderson MD   Cholecalciferol (VITAMIN D3) 50 MCG (2000 UT) TABS Take 1 tablet by mouth daily 22  Yes Anabella Randall MD   Multiple Vitamin (THERA) TABS Take by mouth   Yes ProviderRio MD   tadalafil (CIALIS) 10 MG tablet Take 1 tablet by mouth as needed for Erectile Dysfunction 10/28/24   Tonya Anderson MD   Elastic Bandages & Supports (ACE TENNIS ELBOW BRACE) MISC 2 each by Does not apply route daily 21   Anabella Randall MD       Current medications:

## 2025-02-20 NOTE — ANESTHESIA POSTPROCEDURE EVALUATION
Department of Anesthesiology  Postprocedure Note    Patient: Delvin Young  MRN: [id number]  YOB: 1965  Date of evaluation: 2/20/2025    Procedure Summary       Date: 02/20/25 Room / Location: [place] [address] / [facility]    Anesthesia Start: 0731 Anesthesia Stop: 1014    Procedure: LAPAROSCOPIC CHOLECYSTECTOMY WITH CHOLANGIOGRAM (Abdomen) Diagnosis:       Gallbladder polyp      (Gallbladder polyp [K82.4])    Surgeons: Kalen Brand MD Responsible Provider: Daniel Topete MD    Anesthesia Type: general ASA Status: 2            Anesthesia Type: No value filed.    Flynn Phase I: Flynn Score: 10    Flynn Phase II:      Anesthesia Post Evaluation    Patient location during evaluation: PACU  Patient participation: complete - patient participated  Level of consciousness: awake  Airway patency: patent  Nausea & Vomiting: no nausea and no vomiting  Cardiovascular status: blood pressure returned to baseline and hemodynamically stable  Respiratory status: acceptable  Hydration status: euvolemic  Multimodal analgesia pain management approach  Pain management: adequate    No notable events documented.

## 2025-02-20 NOTE — DISCHARGE INSTRUCTIONS
Discharge Instructions:    You can restart your aspirin 48 hours after surgery.    Diet:   You may resume a regular diet.    Wound Care:   Skin glue was used to cover your incision(s). It will fall off on its own in about 10 days. You may shower, but do not scrub the incision sites directly or soak (tub, pool, etc.).    Activity:   No heavy lifting greater than a milk jug until follow up.    Pain management:   Unless informed of any restrictions by your primary care physician, please use your preferred over-the-counter pain reliever as your primary pain medication. If you have pain that persists despite over-the-counter pain medications, you have been provided with a prescription for an opioid/narcotic pain reliever.   No driving or operating machinery while taking opioid/narcotic medications.    Return Precautions:   Call/ Return to ED for increased redness, worsening pain, drainage from wound, fevers, or any other concerns about your incision or post op course.    Follow up with Dr. Brand in 1-2 weeks. Please call [phone] to schedule your appointment.    [facility] AMBULATORY PROCEDURE DISCHARGE INSTRUCTIONS    There are potential side effects of anesthesia or sedation you may experience for the first 24 hours.  These side effects include:    Confusion or Memory loss, Dizziness, or Delayed Reaction Times   [x]A responsible person should be with you for the next 24 hours.  Do not operate any vehicles (automobiles, bicycles, motorcycles) or power tools or machinery for 24 hours.  Do not sign any legal documents or make any legal decisions for 24 hours. Do not drink alcohol for 24 hours or while taking narcotic pain medication.      Nausea    [x]Start with light diet and progress to your normal diet as you feel like eating. However, if you experience nausea or repeated episodes of vomiting which persist beyond 12-24 hours, notify your physician.  Once nausea has passed, remember to keep drinking

## 2025-02-27 DIAGNOSIS — I25.10 CORONARY ARTERY DISEASE INVOLVING NATIVE CORONARY ARTERY OF NATIVE HEART WITHOUT ANGINA PECTORIS: ICD-10-CM

## 2025-02-27 DIAGNOSIS — E78.00 PURE HYPERCHOLESTEROLEMIA: ICD-10-CM

## 2025-02-27 DIAGNOSIS — R93.1 ABNORMAL SCREENING CARDIAC CT: ICD-10-CM

## 2025-02-27 RX ORDER — ATORVASTATIN CALCIUM 40 MG/1
40 TABLET, FILM COATED ORAL DAILY
Qty: 90 TABLET | Refills: 1 | Status: SHIPPED | OUTPATIENT
Start: 2025-02-27

## 2025-02-27 NOTE — TELEPHONE ENCOUNTER
Requested Prescriptions     Pending Prescriptions Disp Refills    atorvastatin (LIPITOR) 40 MG tablet 90 tablet 0     Sig: Take 1 tablet by mouth daily         Last OV: 2/13/2025    Last labs: 2/17/2025     F/u: Visit date not found

## 2025-03-05 ENCOUNTER — OFFICE VISIT (OUTPATIENT)
Dept: SURGERY | Age: 60
End: 2025-03-05

## 2025-03-05 VITALS
HEART RATE: 84 BPM | DIASTOLIC BLOOD PRESSURE: 69 MMHG | BODY MASS INDEX: 27.01 KG/M2 | SYSTOLIC BLOOD PRESSURE: 117 MMHG | OXYGEN SATURATION: 99 % | HEIGHT: 68 IN | TEMPERATURE: 97.9 F | RESPIRATION RATE: 18 BRPM | WEIGHT: 178.2 LBS

## 2025-03-05 DIAGNOSIS — Z90.49 S/P LAPAROSCOPIC CHOLECYSTECTOMY: Primary | ICD-10-CM

## 2025-03-05 PROCEDURE — 99024 POSTOP FOLLOW-UP VISIT: CPT | Performed by: SURGERY

## 2025-03-05 NOTE — PROGRESS NOTES
PATIENT NAME: Delvin Young     YOB: 1965     TODAY'S DATE: 3/5/2025    Reason for Visit:  Post-op check    Requesting Physician:  Dr. Anderson    HISTORY OF PRESENT ILLNESS:              The patient is a 59 y.o. male with a PMHx as delineated below who presents for follow up without complaints.     Chief Complaint   Patient presents with    Post-Op Check       REVIEW OF SYSTEMS:  CONSTITUTIONAL:  negative  HEENT:  negative  RESPIRATORY:  negative  CARDIOVASCULAR:  negative  GASTROINTESTINAL:  negative except for nausea  GENITOURINARY:  negative  HEMATOLOGIC/LYMPHATIC:  negative  MUSCULOSKELETAL: negative  NEUROLOGICAL:  negative    PMH  Past Medical History:   Diagnosis Date    CAD (coronary artery disease) 04/2022    CT cardiac score = 363 in 4/22    Cholelithiasis 12/2023    Compression fracture of T9 vertebra (HCC) 09/2023    DDD (degenerative disc disease), lumbar     L4-5 ; L5-S1    DDD (degenerative disc disease), 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    per Xrays    Elevated LFTs 09/2023    Engages in vaping     External hemorrhoid     Gallbladder polyp 12/2023    per U/S    Hepatomegaly 12/2023    per U/S    History of dental surgery     permanent bridge    Hx of smoking     Hypertriglyceridemia     Osteopenia of lumbar spine 11/2023    Psoriasis     Scoliosis 09/2023    per Xrays    Scoliosis of thoracic spine 09/2023    Sprain of thoracic region     Wears glasses        PSH  Past Surgical History:   Procedure Laterality Date    CHOLECYSTECTOMY, LAPAROSCOPIC N/A 2/20/2025    LAPAROSCOPIC CHOLECYSTECTOMY WITH CHOLANGIOGRAM performed by Kalen Brand MD at [facility] OR    COLONOSCOPY      DENTAL SURGERY         Social History  Social History     Socioeconomic History    Marital status:      Spouse name: Not on file    Number of children: Not on file    Years of education: Not on file    Highest education level: Not on file   Occupational History    Occupation:    Tobacco Use    Smoking status: Former

## 2025-04-26 DIAGNOSIS — F32.A DEPRESSION, UNSPECIFIED DEPRESSION TYPE: ICD-10-CM

## 2025-04-26 DIAGNOSIS — Z72.89 CURRENT VAPING ON SOME DAYS: ICD-10-CM

## 2025-04-28 RX ORDER — BUPROPION HYDROCHLORIDE 300 MG/1
300 TABLET ORAL EVERY MORNING
Qty: 90 TABLET | Refills: 1 | Status: SHIPPED | OUTPATIENT
Start: 2025-04-28

## 2025-04-28 NOTE — TELEPHONE ENCOUNTER
Requested Prescriptions     Pending Prescriptions Disp Refills    buPROPion (WELLBUTRIN XL) 300 MG extended release tablet [Pharmacy Med Name: buPROPion HCL  MG TABLET] 90 tablet 1     Sig: TAKE 1 TABLET BY MOUTH EVERY MORNING         Last OV: 2/13/2025    Last labs: 2/17/2025     F/u: Visit date not found

## 2025-05-17 DIAGNOSIS — E78.1 HYPERTRIGLYCERIDEMIA: Primary | ICD-10-CM

## 2025-05-19 RX ORDER — LEVOTHYROXINE SODIUM 137 UG/1
137 TABLET ORAL DAILY
Qty: 90 TABLET | Refills: 0 | Status: SHIPPED | OUTPATIENT
Start: 2025-05-19

## 2025-05-19 NOTE — TELEPHONE ENCOUNTER
Requested Prescriptions     Pending Prescriptions Disp Refills    levothyroxine (SYNTHROID) 137 MCG tablet [Pharmacy Med Name: LEVOTHYROXINE 137 MCG TABLET] 90 tablet 0     Sig: TAKE 1 TABLET BY MOUTH DAILY         Last OV: 2/13/2025    Last labs: 2/17/2025     F/u: Visit date not found

## 2025-05-25 DIAGNOSIS — I10 ESSENTIAL HYPERTENSION: ICD-10-CM

## 2025-05-27 RX ORDER — LISINOPRIL 10 MG/1
10 TABLET ORAL DAILY
Qty: 90 TABLET | Refills: 1 | Status: SHIPPED | OUTPATIENT
Start: 2025-05-27

## 2025-05-27 NOTE — TELEPHONE ENCOUNTER
Requested Prescriptions     Pending Prescriptions Disp Refills    lisinopril (PRINIVIL;ZESTRIL) 10 MG tablet [Pharmacy Med Name: LISINOPRIL 10 MG TABLET] 90 tablet 1     Sig: TAKE 1 TABLET BY MOUTH DAILY      Last Visit: 2/13/25    Last Labs: 2/17/25    Next Visit: None

## 2025-06-12 DIAGNOSIS — M51.34 DDD (DEGENERATIVE DISC DISEASE), THORACIC: ICD-10-CM

## 2025-06-12 DIAGNOSIS — M51.360 DEGENERATION OF INTERVERTEBRAL DISC OF LUMBAR REGION WITH DISCOGENIC BACK PAIN: ICD-10-CM

## 2025-06-12 DIAGNOSIS — M50.90 CERVICAL DISC DISEASE: ICD-10-CM

## 2025-06-12 RX ORDER — CYCLOBENZAPRINE HCL 10 MG
TABLET ORAL
Qty: 90 TABLET | Refills: 1 | OUTPATIENT
Start: 2025-06-12

## 2025-06-12 RX ORDER — TRAMADOL HYDROCHLORIDE 50 MG/1
TABLET ORAL
Qty: 30 TABLET | OUTPATIENT
Start: 2025-06-12

## 2025-06-20 RX ORDER — CYCLOBENZAPRINE HCL 10 MG
10 TABLET ORAL 3 TIMES DAILY PRN
Qty: 25 TABLET | Refills: 0 | Status: SHIPPED | OUTPATIENT
Start: 2025-06-20 | End: 2025-06-30

## 2025-07-15 DIAGNOSIS — R93.1 ABNORMAL SCREENING CARDIAC CT: ICD-10-CM

## 2025-07-15 DIAGNOSIS — E78.00 PURE HYPERCHOLESTEROLEMIA: ICD-10-CM

## 2025-07-15 DIAGNOSIS — I25.10 CORONARY ARTERY DISEASE INVOLVING NATIVE CORONARY ARTERY OF NATIVE HEART WITHOUT ANGINA PECTORIS: ICD-10-CM

## 2025-07-15 RX ORDER — ATORVASTATIN CALCIUM 40 MG/1
40 TABLET, FILM COATED ORAL DAILY
Qty: 90 TABLET | Refills: 0 | Status: SHIPPED | OUTPATIENT
Start: 2025-07-15

## 2025-07-15 NOTE — TELEPHONE ENCOUNTER
Requested Prescriptions     Pending Prescriptions Disp Refills    atorvastatin (LIPITOR) 40 MG tablet [Pharmacy Med Name: ATORVASTATIN 40 MG TABLET] 90 tablet 1     Sig: TAKE 1 TABLET BY MOUTH DAILY      Last Visit: 2/13/25    Last Labs: 2/17/25     Next Visit: None

## 2025-09-05 ENCOUNTER — OFFICE VISIT (OUTPATIENT)
Age: 60
End: 2025-09-05
Payer: COMMERCIAL

## 2025-09-05 VITALS
HEIGHT: 68 IN | HEART RATE: 68 BPM | WEIGHT: 188.6 LBS | OXYGEN SATURATION: 98 % | SYSTOLIC BLOOD PRESSURE: 118 MMHG | TEMPERATURE: 97.5 F | BODY MASS INDEX: 28.58 KG/M2 | DIASTOLIC BLOOD PRESSURE: 72 MMHG | RESPIRATION RATE: 16 BRPM

## 2025-09-05 DIAGNOSIS — S22.070A COMPRESSION FRACTURE OF T9 VERTEBRA, INITIAL ENCOUNTER (HCC): ICD-10-CM

## 2025-09-05 DIAGNOSIS — M41.9 SCOLIOSIS OF THORACIC SPINE, UNSPECIFIED SCOLIOSIS TYPE: ICD-10-CM

## 2025-09-05 DIAGNOSIS — M51.34 DDD (DEGENERATIVE DISC DISEASE), THORACIC: Primary | ICD-10-CM

## 2025-09-05 DIAGNOSIS — R93.1 ABNORMAL SCREENING CARDIAC CT: ICD-10-CM

## 2025-09-05 DIAGNOSIS — Z23 NEED FOR INFLUENZA VACCINATION: ICD-10-CM

## 2025-09-05 DIAGNOSIS — M50.90 CERVICAL DISC DISEASE: ICD-10-CM

## 2025-09-05 DIAGNOSIS — Z12.2 SCREENING FOR LUNG CANCER: ICD-10-CM

## 2025-09-05 DIAGNOSIS — E03.9 ACQUIRED HYPOTHYROIDISM: ICD-10-CM

## 2025-09-05 DIAGNOSIS — I25.10 CORONARY ARTERY DISEASE INVOLVING NATIVE CORONARY ARTERY OF NATIVE HEART WITHOUT ANGINA PECTORIS: ICD-10-CM

## 2025-09-05 DIAGNOSIS — Z12.5 PROSTATE CANCER SCREENING: ICD-10-CM

## 2025-09-05 DIAGNOSIS — I10 ESSENTIAL HYPERTENSION: ICD-10-CM

## 2025-09-05 DIAGNOSIS — M51.360 DEGENERATION OF INTERVERTEBRAL DISC OF LUMBAR REGION WITH DISCOGENIC BACK PAIN: ICD-10-CM

## 2025-09-05 DIAGNOSIS — Z87.891 PERSONAL HISTORY OF TOBACCO USE: ICD-10-CM

## 2025-09-05 DIAGNOSIS — E78.1 HYPERTRIGLYCERIDEMIA: ICD-10-CM

## 2025-09-05 DIAGNOSIS — Z87.891 FORMER SMOKER: ICD-10-CM

## 2025-09-05 DIAGNOSIS — R73.09 ELEVATED GLUCOSE: ICD-10-CM

## 2025-09-05 LAB
ALCOHOL URINE: NORMAL
AMPHETAMINE SCREEN URINE: NORMAL
BARBITURATE SCREEN URINE: NORMAL
BENZODIAZEPINE SCREEN, URINE: NORMAL
BUPRENORPHINE URINE: NORMAL
COCAINE METABOLITE SCREEN URINE: NORMAL
FENTANYL SCREEN, URINE: NORMAL
GABAPENTIN SCREEN, URINE: NORMAL
MDMA, URINE: NORMAL
METHADONE SCREEN, URINE: NORMAL
METHAMPHETAMINE, URINE: NORMAL
OPIATE SCREEN URINE: NORMAL
OXYCODONE SCREEN URINE: NORMAL
PHENCYCLIDINE SCREEN URINE: NORMAL
PROPOXYPHENE SCREEN, URINE: NORMAL
SYNTHETIC CANNABINOIDS(K2) SCREEN, URINE: NORMAL
THC SCREEN, URINE: NORMAL
TRAMADOL SCREEN URINE: NORMAL
TRICYCLIC ANTIDEPRESSANTS, UR: NORMAL

## 2025-09-05 PROCEDURE — G0296 VISIT TO DETERM LDCT ELIG: HCPCS | Performed by: FAMILY MEDICINE

## 2025-09-05 PROCEDURE — 3078F DIAST BP <80 MM HG: CPT | Performed by: FAMILY MEDICINE

## 2025-09-05 PROCEDURE — 4004F PT TOBACCO SCREEN RCVD TLK: CPT | Performed by: FAMILY MEDICINE

## 2025-09-05 PROCEDURE — 3017F COLORECTAL CA SCREEN DOC REV: CPT | Performed by: FAMILY MEDICINE

## 2025-09-05 PROCEDURE — 99214 OFFICE O/P EST MOD 30 MIN: CPT | Performed by: FAMILY MEDICINE

## 2025-09-05 PROCEDURE — 80305 DRUG TEST PRSMV DIR OPT OBS: CPT | Performed by: FAMILY MEDICINE

## 2025-09-05 PROCEDURE — G8427 DOCREV CUR MEDS BY ELIG CLIN: HCPCS | Performed by: FAMILY MEDICINE

## 2025-09-05 PROCEDURE — 3074F SYST BP LT 130 MM HG: CPT | Performed by: FAMILY MEDICINE

## 2025-09-05 PROCEDURE — G8417 CALC BMI ABV UP PARAM F/U: HCPCS | Performed by: FAMILY MEDICINE

## 2025-09-05 RX ORDER — CELECOXIB 200 MG/1
200 CAPSULE ORAL DAILY
Qty: 30 CAPSULE | Refills: 2 | Status: SHIPPED | OUTPATIENT
Start: 2025-09-05

## 2025-09-05 RX ORDER — TRAMADOL HYDROCHLORIDE 100 MG/1
100 TABLET, COATED ORAL EVERY 8 HOURS PRN
Qty: 60 TABLET | Refills: 0 | Status: SHIPPED | OUTPATIENT
Start: 2025-09-05

## 2025-09-05 RX ORDER — CYCLOBENZAPRINE HCL 10 MG
TABLET ORAL
Qty: 90 TABLET | Refills: 1 | Status: SHIPPED | OUTPATIENT
Start: 2025-09-05

## 2025-09-05 RX ORDER — TADALAFIL 5 MG/1
5 TABLET ORAL DAILY
COMMUNITY
Start: 2025-06-19

## (undated) DEVICE — TOWEL,STOP FLAG GOLD N-W: Brand: MEDLINE

## (undated) DEVICE — 40583 XL ADVANCED TRENDELENBURG POSITIONING KIT: Brand: 40583 XL ADVANCED TRENDELENBURG POSITIONING KIT

## (undated) DEVICE — GARMENT,MEDLINE,DVT,INT,CALF,MED, GEN2: Brand: MEDLINE

## (undated) DEVICE — TRAP FLUID

## (undated) DEVICE — CATHETER URET 4FR L70CM POLYUR WHSTL FLX TIP KINK RESIST W/

## (undated) DEVICE — COVER,LIGHT HANDLE,FLX,1/PK: Brand: MEDLINE INDUSTRIES, INC.

## (undated) DEVICE — MEDIA CONTRAST INJ OMNIPAQUE 300 50ML

## (undated) DEVICE — SOLUTION INJ LR VISIV 1000ML BG

## (undated) DEVICE — SYRINGE MED 10ML LUERLOCK TIP W/O SFTY DISP

## (undated) DEVICE — TISSUE RETRIEVAL SYSTEM: Brand: INZII RETRIEVAL SYSTEM

## (undated) DEVICE — GENERAL LAPAROSCOPIC: Brand: MEDLINE INDUSTRIES, INC.

## (undated) DEVICE — SET EXTN PRIMING 4.9ML L30IN INCL SLDE CLMP SPIN M LUERLOCK

## (undated) DEVICE — ELECTRODE LAP L36CM PTFE WIRE J HK CLEANCOAT

## (undated) DEVICE — SUTURE MONOCRYL + SZ 4-0 L18IN ABSRB UD L19MM PS-2 3/8 CIR MCP496G

## (undated) DEVICE — LIQUIBAND RAPID ADHESIVE 36/CS 0.8ML: Brand: MEDLINE

## (undated) DEVICE — LARGE BORE STOPCOCK WITH ROTATING MALE LUER LOCK

## (undated) DEVICE — NEEDLE,22GX1.5",REG,BEVEL: Brand: MEDLINE

## (undated) DEVICE — DRAPE C ARM W/ POLY STRP W42XL72IN FOR MOB XR

## (undated) DEVICE — SCISSORS ENDOSCP DIA5MM CRV MPLR CAUT W/ RATCH HNDL

## (undated) DEVICE — SOLUTION SURG PREP 26 CC PURPREP

## (undated) DEVICE — GLOVE SURG SZ 7 L12IN FNGR THK75MIL WHT LTX POLYMER BEAD

## (undated) DEVICE — TROCARS: Brand: KII® BALLOON BLUNT TIP SYSTEM

## (undated) DEVICE — PUMP SUC IRR TBNG L10FT W/ HNDPC ASSEMB STRYKEFLOW 2

## (undated) DEVICE — TROCAR: Brand: KII SHIELDED BLADED ACCESS SYSTEM

## (undated) DEVICE — BLADE ES ELASTOMERIC COAT INSUL DURABLE BEND UPTO 90DEG

## (undated) DEVICE — SUTURE VICRYL + SZ 0 L27IN ABSRB WHT CT-2 1/2 CIR TAPERPOINT VCP270H

## (undated) DEVICE — KIT,ANTI FOG,W/SPONGE & FLUID,SOFT PACK: Brand: MEDLINE

## (undated) DEVICE — CORD ES L10FT MPLR LAP